# Patient Record
Sex: MALE | Race: WHITE | Employment: OTHER | URBAN - METROPOLITAN AREA
[De-identification: names, ages, dates, MRNs, and addresses within clinical notes are randomized per-mention and may not be internally consistent; named-entity substitution may affect disease eponyms.]

---

## 2017-04-19 ENCOUNTER — PATIENT MESSAGE (OUTPATIENT)
Dept: FAMILY MEDICINE CLINIC | Age: 62
End: 2017-04-19

## 2017-04-26 DIAGNOSIS — Z12.11 COLON CANCER SCREENING: Primary | ICD-10-CM

## 2017-06-13 ENCOUNTER — OFFICE VISIT (OUTPATIENT)
Dept: FAMILY MEDICINE CLINIC | Age: 62
End: 2017-06-13
Payer: COMMERCIAL

## 2017-06-13 VITALS
WEIGHT: 295 LBS | HEIGHT: 70 IN | DIASTOLIC BLOOD PRESSURE: 74 MMHG | HEART RATE: 85 BPM | BODY MASS INDEX: 42.23 KG/M2 | SYSTOLIC BLOOD PRESSURE: 122 MMHG

## 2017-06-13 DIAGNOSIS — I10 ESSENTIAL HYPERTENSION: ICD-10-CM

## 2017-06-13 PROCEDURE — 99213 OFFICE O/P EST LOW 20 MIN: CPT | Performed by: FAMILY MEDICINE

## 2017-06-13 ASSESSMENT — PATIENT HEALTH QUESTIONNAIRE - PHQ9
SUM OF ALL RESPONSES TO PHQ QUESTIONS 1-9: 0
2. FEELING DOWN, DEPRESSED OR HOPELESS: 0
SUM OF ALL RESPONSES TO PHQ9 QUESTIONS 1 & 2: 0
1. LITTLE INTEREST OR PLEASURE IN DOING THINGS: 0

## 2017-06-13 ASSESSMENT — ENCOUNTER SYMPTOMS
ABDOMINAL PAIN: 0
CONSTIPATION: 0
BACK PAIN: 0
COUGH: 0
DIARRHEA: 0
WHEEZING: 0
BLOOD IN STOOL: 0
SHORTNESS OF BREATH: 0

## 2017-12-18 ENCOUNTER — OFFICE VISIT (OUTPATIENT)
Dept: FAMILY MEDICINE CLINIC | Age: 62
End: 2017-12-18
Payer: COMMERCIAL

## 2017-12-18 VITALS
SYSTOLIC BLOOD PRESSURE: 136 MMHG | HEART RATE: 74 BPM | BODY MASS INDEX: 37.62 KG/M2 | WEIGHT: 254 LBS | HEIGHT: 69 IN | DIASTOLIC BLOOD PRESSURE: 78 MMHG

## 2017-12-18 DIAGNOSIS — K43.9 ABDOMINAL WALL HERNIA: ICD-10-CM

## 2017-12-18 DIAGNOSIS — I10 ESSENTIAL HYPERTENSION: Primary | ICD-10-CM

## 2017-12-18 PROCEDURE — 99213 OFFICE O/P EST LOW 20 MIN: CPT | Performed by: FAMILY MEDICINE

## 2017-12-18 RX ORDER — BIOTIN 1 MG
1 TABLET ORAL DAILY
COMMUNITY

## 2017-12-18 RX ORDER — CHLORAL HYDRATE 500 MG
3000 CAPSULE ORAL 3 TIMES DAILY
Status: ON HOLD | COMMUNITY
End: 2020-11-20

## 2017-12-18 ASSESSMENT — ENCOUNTER SYMPTOMS
COUGH: 0
BACK PAIN: 0
BLOOD IN STOOL: 0
CONSTIPATION: 0
DIARRHEA: 0
ABDOMINAL PAIN: 0
WHEEZING: 0
SHORTNESS OF BREATH: 0

## 2017-12-20 DIAGNOSIS — I10 ESSENTIAL HYPERTENSION: ICD-10-CM

## 2017-12-20 RX ORDER — BISOPROLOL FUMARATE AND HYDROCHLOROTHIAZIDE 5; 6.25 MG/1; MG/1
TABLET ORAL
Qty: 90 TABLET | Refills: 3 | Status: SHIPPED | OUTPATIENT
Start: 2017-12-20 | End: 2018-12-17 | Stop reason: SDUPTHER

## 2018-12-17 DIAGNOSIS — I10 ESSENTIAL HYPERTENSION: ICD-10-CM

## 2018-12-17 RX ORDER — BISOPROLOL FUMARATE AND HYDROCHLOROTHIAZIDE 5; 6.25 MG/1; MG/1
TABLET ORAL
Qty: 90 TABLET | Refills: 3 | Status: SHIPPED | OUTPATIENT
Start: 2018-12-17 | End: 2019-12-14 | Stop reason: SDUPTHER

## 2019-12-14 DIAGNOSIS — I10 ESSENTIAL HYPERTENSION: ICD-10-CM

## 2019-12-16 RX ORDER — BISOPROLOL FUMARATE AND HYDROCHLOROTHIAZIDE 5; 6.25 MG/1; MG/1
TABLET ORAL
Qty: 90 TABLET | Refills: 0 | Status: SHIPPED | OUTPATIENT
Start: 2019-12-16 | End: 2020-03-16

## 2020-03-16 RX ORDER — BISOPROLOL FUMARATE AND HYDROCHLOROTHIAZIDE 5; 6.25 MG/1; MG/1
TABLET ORAL
Qty: 90 TABLET | Refills: 3 | Status: SHIPPED | OUTPATIENT
Start: 2020-03-16 | End: 2021-06-04

## 2020-11-19 ENCOUNTER — APPOINTMENT (OUTPATIENT)
Dept: ULTRASOUND IMAGING | Age: 65
DRG: 415 | End: 2020-11-19
Payer: COMMERCIAL

## 2020-11-19 ENCOUNTER — NURSE TRIAGE (OUTPATIENT)
Dept: OTHER | Facility: CLINIC | Age: 65
End: 2020-11-19

## 2020-11-19 ENCOUNTER — HOSPITAL ENCOUNTER (INPATIENT)
Age: 65
LOS: 3 days | Discharge: HOME | DRG: 415 | End: 2020-11-22
Attending: EMERGENCY MEDICINE | Admitting: FAMILY MEDICINE
Payer: COMMERCIAL

## 2020-11-19 PROBLEM — Z85.038 HISTORY OF COLON CANCER: Status: ACTIVE | Noted: 2020-11-19

## 2020-11-19 PROBLEM — K81.0 CHOLECYSTITIS, ACUTE: Status: ACTIVE | Noted: 2020-11-19

## 2020-11-19 PROBLEM — E66.9 OBESITY (BMI 35.0-39.9 WITHOUT COMORBIDITY): Chronic | Status: ACTIVE | Noted: 2020-11-19

## 2020-11-19 PROBLEM — K21.9 GERD (GASTROESOPHAGEAL REFLUX DISEASE): Status: ACTIVE | Noted: 2020-11-19

## 2020-11-19 PROBLEM — K81.9 CHOLECYSTITIS: Status: ACTIVE | Noted: 2020-11-19

## 2020-11-19 LAB
ABSOLUTE EOS #: 0.03 K/UL (ref 0–0.44)
ABSOLUTE IMMATURE GRANULOCYTE: 0.03 K/UL (ref 0–0.3)
ABSOLUTE LYMPH #: 0.79 K/UL (ref 1.1–3.7)
ABSOLUTE MONO #: 0.62 K/UL (ref 0.1–1.2)
ALBUMIN SERPL-MCNC: 4.3 G/DL (ref 3.5–5.2)
ALBUMIN/GLOBULIN RATIO: ABNORMAL (ref 1–2.5)
ALP BLD-CCNC: 98 U/L (ref 40–129)
ALT SERPL-CCNC: 28 U/L (ref 5–41)
AMYLASE: 20 U/L (ref 28–100)
ANION GAP SERPL CALCULATED.3IONS-SCNC: 8 MMOL/L (ref 9–17)
AST SERPL-CCNC: 68 U/L
BASOPHILS # BLD: 0 % (ref 0–2)
BASOPHILS ABSOLUTE: <0.03 K/UL (ref 0–0.2)
BILIRUB SERPL-MCNC: 1.91 MG/DL (ref 0.3–1.2)
BILIRUBIN DIRECT: 0.88 MG/DL
BILIRUBIN, INDIRECT: 1.03 MG/DL (ref 0–1)
BUN BLDV-MCNC: 12 MG/DL (ref 8–23)
BUN/CREAT BLD: 19 (ref 9–20)
CALCIUM SERPL-MCNC: 9.2 MG/DL (ref 8.6–10.4)
CHLORIDE BLD-SCNC: 97 MMOL/L (ref 98–107)
CO2: 29 MMOL/L (ref 20–31)
CREAT SERPL-MCNC: 0.62 MG/DL (ref 0.7–1.2)
DIFFERENTIAL TYPE: ABNORMAL
EOSINOPHILS RELATIVE PERCENT: 0 % (ref 1–4)
GFR AFRICAN AMERICAN: >60 ML/MIN
GFR NON-AFRICAN AMERICAN: >60 ML/MIN
GFR SERPL CREATININE-BSD FRML MDRD: ABNORMAL ML/MIN/{1.73_M2}
GFR SERPL CREATININE-BSD FRML MDRD: ABNORMAL ML/MIN/{1.73_M2}
GLOBULIN: ABNORMAL G/DL (ref 1.5–3.8)
GLUCOSE BLD-MCNC: 128 MG/DL (ref 70–99)
HCT VFR BLD CALC: 38.4 % (ref 40.7–50.3)
HEMOGLOBIN: 13.3 G/DL (ref 13–17)
IMMATURE GRANULOCYTES: 0 %
LACTIC ACID, SEPSIS WHOLE BLOOD: NORMAL MMOL/L (ref 0.5–1.9)
LACTIC ACID, SEPSIS WHOLE BLOOD: NORMAL MMOL/L (ref 0.5–1.9)
LACTIC ACID, SEPSIS: 0.9 MMOL/L (ref 0.5–1.9)
LACTIC ACID, SEPSIS: 1 MMOL/L (ref 0.5–1.9)
LIPASE: 17 U/L (ref 13–60)
LYMPHOCYTES # BLD: 11 % (ref 24–43)
MCH RBC QN AUTO: 34.8 PG (ref 25.2–33.5)
MCHC RBC AUTO-ENTMCNC: 34.6 G/DL (ref 28.4–34.8)
MCV RBC AUTO: 100.5 FL (ref 82.6–102.9)
MONOCYTES # BLD: 9 % (ref 3–12)
NRBC AUTOMATED: 0 PER 100 WBC
PDW BLD-RTO: 13.1 % (ref 11.8–14.4)
PLATELET # BLD: 144 K/UL (ref 138–453)
PLATELET ESTIMATE: ABNORMAL
PMV BLD AUTO: 9.2 FL (ref 8.1–13.5)
POTASSIUM SERPL-SCNC: 3.8 MMOL/L (ref 3.7–5.3)
RBC # BLD: 3.82 M/UL (ref 4.21–5.77)
RBC # BLD: ABNORMAL 10*6/UL
SEG NEUTROPHILS: 80 % (ref 36–65)
SEGMENTED NEUTROPHILS ABSOLUTE COUNT: 5.79 K/UL (ref 1.5–8.1)
SODIUM BLD-SCNC: 134 MMOL/L (ref 135–144)
TOTAL PROTEIN: 7.6 G/DL (ref 6.4–8.3)
WBC # BLD: 7.3 K/UL (ref 3.5–11.3)
WBC # BLD: ABNORMAL 10*3/UL

## 2020-11-19 PROCEDURE — 2500000003 HC RX 250 WO HCPCS: Performed by: SURGERY

## 2020-11-19 PROCEDURE — 80076 HEPATIC FUNCTION PANEL: CPT

## 2020-11-19 PROCEDURE — 6360000002 HC RX W HCPCS: Performed by: NURSE PRACTITIONER

## 2020-11-19 PROCEDURE — C9113 INJ PANTOPRAZOLE SODIUM, VIA: HCPCS | Performed by: NURSE PRACTITIONER

## 2020-11-19 PROCEDURE — 76705 ECHO EXAM OF ABDOMEN: CPT

## 2020-11-19 PROCEDURE — 83605 ASSAY OF LACTIC ACID: CPT

## 2020-11-19 PROCEDURE — 99219 PR INITIAL OBSERVATION CARE/DAY 50 MINUTES: CPT | Performed by: FAMILY MEDICINE

## 2020-11-19 PROCEDURE — 2580000003 HC RX 258: Performed by: SURGERY

## 2020-11-19 PROCEDURE — 82150 ASSAY OF AMYLASE: CPT

## 2020-11-19 PROCEDURE — 2580000003 HC RX 258: Performed by: NURSE PRACTITIONER

## 2020-11-19 PROCEDURE — APPSS45 APP SPLIT SHARED TIME 31-45 MINUTES: Performed by: NURSE PRACTITIONER

## 2020-11-19 PROCEDURE — 6360000002 HC RX W HCPCS: Performed by: SURGERY

## 2020-11-19 PROCEDURE — 85025 COMPLETE CBC W/AUTO DIFF WBC: CPT

## 2020-11-19 PROCEDURE — 96376 TX/PRO/DX INJ SAME DRUG ADON: CPT

## 2020-11-19 PROCEDURE — G0378 HOSPITAL OBSERVATION PER HR: HCPCS

## 2020-11-19 PROCEDURE — 83690 ASSAY OF LIPASE: CPT

## 2020-11-19 PROCEDURE — 1200000000 HC SEMI PRIVATE

## 2020-11-19 PROCEDURE — 80048 BASIC METABOLIC PNL TOTAL CA: CPT

## 2020-11-19 PROCEDURE — 99282 EMERGENCY DEPT VISIT SF MDM: CPT

## 2020-11-19 PROCEDURE — 96365 THER/PROPH/DIAG IV INF INIT: CPT

## 2020-11-19 PROCEDURE — 87040 BLOOD CULTURE FOR BACTERIA: CPT

## 2020-11-19 PROCEDURE — 96375 TX/PRO/DX INJ NEW DRUG ADDON: CPT

## 2020-11-19 RX ORDER — NICOTINE 21 MG/24HR
1 PATCH, TRANSDERMAL 24 HOURS TRANSDERMAL DAILY PRN
Status: DISCONTINUED | OUTPATIENT
Start: 2020-11-19 | End: 2020-11-20

## 2020-11-19 RX ORDER — POTASSIUM CHLORIDE 20 MEQ/1
40 TABLET, EXTENDED RELEASE ORAL PRN
Status: DISCONTINUED | OUTPATIENT
Start: 2020-11-19 | End: 2020-11-20

## 2020-11-19 RX ORDER — DEXTROSE, SODIUM CHLORIDE, AND POTASSIUM CHLORIDE 5; .45; .15 G/100ML; G/100ML; G/100ML
INJECTION INTRAVENOUS CONTINUOUS
Status: DISCONTINUED | OUTPATIENT
Start: 2020-11-19 | End: 2020-11-20

## 2020-11-19 RX ORDER — POTASSIUM CHLORIDE 7.45 MG/ML
10 INJECTION INTRAVENOUS PRN
Status: DISCONTINUED | OUTPATIENT
Start: 2020-11-19 | End: 2020-11-20

## 2020-11-19 RX ORDER — SODIUM CHLORIDE 9 MG/ML
10 INJECTION INTRAVENOUS ONCE
Status: COMPLETED | OUTPATIENT
Start: 2020-11-19 | End: 2020-11-19

## 2020-11-19 RX ORDER — ONDANSETRON 2 MG/ML
4 INJECTION INTRAMUSCULAR; INTRAVENOUS EVERY 6 HOURS PRN
Status: DISCONTINUED | OUTPATIENT
Start: 2020-11-19 | End: 2020-11-20

## 2020-11-19 RX ORDER — MORPHINE SULFATE 2 MG/ML
2 INJECTION, SOLUTION INTRAMUSCULAR; INTRAVENOUS ONCE
Status: COMPLETED | OUTPATIENT
Start: 2020-11-19 | End: 2020-11-19

## 2020-11-19 RX ORDER — POLYETHYLENE GLYCOL 3350 17 G/17G
17 POWDER, FOR SOLUTION ORAL DAILY PRN
Status: DISCONTINUED | OUTPATIENT
Start: 2020-11-19 | End: 2020-11-20

## 2020-11-19 RX ORDER — MORPHINE SULFATE 2 MG/ML
2 INJECTION, SOLUTION INTRAMUSCULAR; INTRAVENOUS
Status: DISCONTINUED | OUTPATIENT
Start: 2020-11-19 | End: 2020-11-20

## 2020-11-19 RX ORDER — ONDANSETRON 2 MG/ML
4 INJECTION INTRAMUSCULAR; INTRAVENOUS ONCE
Status: COMPLETED | OUTPATIENT
Start: 2020-11-19 | End: 2020-11-19

## 2020-11-19 RX ORDER — PANTOPRAZOLE SODIUM 40 MG/10ML
40 INJECTION, POWDER, LYOPHILIZED, FOR SOLUTION INTRAVENOUS ONCE
Status: COMPLETED | OUTPATIENT
Start: 2020-11-19 | End: 2020-11-19

## 2020-11-19 RX ORDER — PROMETHAZINE HYDROCHLORIDE 25 MG/1
12.5 TABLET ORAL EVERY 6 HOURS PRN
Status: DISCONTINUED | OUTPATIENT
Start: 2020-11-19 | End: 2020-11-20

## 2020-11-19 RX ORDER — ACETAMINOPHEN 325 MG/1
650 TABLET ORAL EVERY 6 HOURS PRN
Status: DISCONTINUED | OUTPATIENT
Start: 2020-11-19 | End: 2020-11-20

## 2020-11-19 RX ORDER — SODIUM CHLORIDE 0.9 % (FLUSH) 0.9 %
10 SYRINGE (ML) INJECTION PRN
Status: DISCONTINUED | OUTPATIENT
Start: 2020-11-19 | End: 2020-11-20

## 2020-11-19 RX ORDER — SODIUM CHLORIDE 9 MG/ML
INJECTION, SOLUTION INTRAVENOUS CONTINUOUS
Status: DISCONTINUED | OUTPATIENT
Start: 2020-11-19 | End: 2020-11-19

## 2020-11-19 RX ORDER — ACETAMINOPHEN 650 MG/1
650 SUPPOSITORY RECTAL EVERY 6 HOURS PRN
Status: DISCONTINUED | OUTPATIENT
Start: 2020-11-19 | End: 2020-11-20

## 2020-11-19 RX ORDER — PANTOPRAZOLE SODIUM 40 MG/1
40 TABLET, DELAYED RELEASE ORAL
Status: DISCONTINUED | OUTPATIENT
Start: 2020-11-20 | End: 2020-11-20

## 2020-11-19 RX ORDER — MORPHINE SULFATE 4 MG/ML
4 INJECTION, SOLUTION INTRAMUSCULAR; INTRAVENOUS
Status: DISCONTINUED | OUTPATIENT
Start: 2020-11-19 | End: 2020-11-20

## 2020-11-19 RX ORDER — SODIUM CHLORIDE 0.9 % (FLUSH) 0.9 %
10 SYRINGE (ML) INJECTION EVERY 12 HOURS SCHEDULED
Status: DISCONTINUED | OUTPATIENT
Start: 2020-11-19 | End: 2020-11-20

## 2020-11-19 RX ORDER — MAGNESIUM SULFATE 1 G/100ML
1 INJECTION INTRAVENOUS PRN
Status: DISCONTINUED | OUTPATIENT
Start: 2020-11-19 | End: 2020-11-20

## 2020-11-19 RX ORDER — MORPHINE SULFATE 4 MG/ML
4 INJECTION, SOLUTION INTRAMUSCULAR; INTRAVENOUS
Status: COMPLETED | OUTPATIENT
Start: 2020-11-19 | End: 2020-11-19

## 2020-11-19 RX ADMIN — ONDANSETRON 4 MG: 2 INJECTION INTRAMUSCULAR; INTRAVENOUS at 11:20

## 2020-11-19 RX ADMIN — MORPHINE SULFATE 2 MG: 2 INJECTION, SOLUTION INTRAMUSCULAR; INTRAVENOUS at 12:55

## 2020-11-19 RX ADMIN — POTASSIUM CHLORIDE, DEXTROSE MONOHYDRATE AND SODIUM CHLORIDE: 150; 5; 450 INJECTION, SOLUTION INTRAVENOUS at 16:46

## 2020-11-19 RX ADMIN — PIPERACILLIN AND TAZOBACTAM 3.38 G: 3; .375 INJECTION, POWDER, LYOPHILIZED, FOR SOLUTION INTRAVENOUS at 18:23

## 2020-11-19 RX ADMIN — SODIUM CHLORIDE: 9 INJECTION, SOLUTION INTRAVENOUS at 11:20

## 2020-11-19 RX ADMIN — MORPHINE SULFATE 4 MG: 4 INJECTION, SOLUTION INTRAMUSCULAR; INTRAVENOUS at 20:37

## 2020-11-19 RX ADMIN — ONDANSETRON 4 MG: 2 INJECTION INTRAMUSCULAR; INTRAVENOUS at 12:55

## 2020-11-19 RX ADMIN — PIPERACILLIN SODIUM AND TAZOBACTAM SODIUM 4.5 G: 4; .5 INJECTION, POWDER, LYOPHILIZED, FOR SOLUTION INTRAVENOUS at 13:09

## 2020-11-19 RX ADMIN — MORPHINE SULFATE 4 MG: 4 INJECTION, SOLUTION INTRAMUSCULAR; INTRAVENOUS at 15:13

## 2020-11-19 RX ADMIN — Medication 10 ML: at 11:20

## 2020-11-19 RX ADMIN — POTASSIUM CHLORIDE, DEXTROSE MONOHYDRATE AND SODIUM CHLORIDE: 150; 5; 450 INJECTION, SOLUTION INTRAVENOUS at 23:59

## 2020-11-19 RX ADMIN — PANTOPRAZOLE SODIUM 40 MG: 40 INJECTION, POWDER, FOR SOLUTION INTRAVENOUS at 11:20

## 2020-11-19 RX ADMIN — Medication 10 ML: at 18:12

## 2020-11-19 RX ADMIN — Medication 10 ML: at 16:44

## 2020-11-19 RX ADMIN — MORPHINE SULFATE 4 MG: 4 INJECTION, SOLUTION INTRAMUSCULAR; INTRAVENOUS at 18:12

## 2020-11-19 RX ADMIN — MORPHINE SULFATE 4 MG: 4 INJECTION, SOLUTION INTRAMUSCULAR; INTRAVENOUS at 23:56

## 2020-11-19 ASSESSMENT — PAIN DESCRIPTION - ONSET
ONSET: ON-GOING

## 2020-11-19 ASSESSMENT — PAIN DESCRIPTION - PAIN TYPE
TYPE: ACUTE PAIN

## 2020-11-19 ASSESSMENT — PAIN DESCRIPTION - LOCATION
LOCATION: ABDOMEN

## 2020-11-19 ASSESSMENT — PAIN DESCRIPTION - FREQUENCY
FREQUENCY: CONTINUOUS
FREQUENCY: CONTINUOUS
FREQUENCY: INTERMITTENT
FREQUENCY: CONTINUOUS

## 2020-11-19 ASSESSMENT — PAIN SCALES - GENERAL
PAINLEVEL_OUTOF10: 7
PAINLEVEL_OUTOF10: 9
PAINLEVEL_OUTOF10: 9
PAINLEVEL_OUTOF10: 3
PAINLEVEL_OUTOF10: 7
PAINLEVEL_OUTOF10: 5
PAINLEVEL_OUTOF10: 3
PAINLEVEL_OUTOF10: 3
PAINLEVEL_OUTOF10: 4
PAINLEVEL_OUTOF10: 7
PAINLEVEL_OUTOF10: 5
PAINLEVEL_OUTOF10: 2

## 2020-11-19 ASSESSMENT — ENCOUNTER SYMPTOMS
CONSTIPATION: 0
RHINORRHEA: 0
VOMITING: 1
SHORTNESS OF BREATH: 0
BACK PAIN: 0
NAUSEA: 1
COUGH: 0
WHEEZING: 0
ABDOMINAL PAIN: 1
SORE THROAT: 0
COLOR CHANGE: 0
DIARRHEA: 1
DIARRHEA: 0

## 2020-11-19 ASSESSMENT — PAIN - FUNCTIONAL ASSESSMENT
PAIN_FUNCTIONAL_ASSESSMENT: ACTIVITIES ARE NOT PREVENTED

## 2020-11-19 ASSESSMENT — PAIN DESCRIPTION - ORIENTATION
ORIENTATION: RIGHT;UPPER

## 2020-11-19 ASSESSMENT — PAIN DESCRIPTION - PROGRESSION
CLINICAL_PROGRESSION: GRADUALLY IMPROVING
CLINICAL_PROGRESSION: GRADUALLY WORSENING

## 2020-11-19 ASSESSMENT — PAIN DESCRIPTION - DESCRIPTORS
DESCRIPTORS: SHARP
DESCRIPTORS: SHARP;ACHING

## 2020-11-19 NOTE — ED PROVIDER NOTES
[facility] ED  eMERGENCY dEPARTMENT eNCOUnter      Pt Name: Timothy Dunn  MRN: [id number]  [place] 1955  Date of evaluation: 11/19/2020  Provider: LAWRENCE Smith CNP    CHIEF COMPLAINT       Chief Complaint   Patient presents with    Abdominal Pain     UPPER ABD PAIN  X 4 DAYS         HISTORY OF PRESENT ILLNESS  (Location/Symptom, Timing/Onset, Context/Setting, Quality, Duration, Modifying Factors, Severity.)   Timothy Dunn is a 63 y.o. male who presents to the emergency department via private auto for epigastric pain that radiates to the remainder of his abdomen. Onset was 4 days ago. Reports N/V/D. Denies cough, fever, urinary symptoms. Rates his pain 8/10. Nursing Notes were reviewed. ALLERGIES     Patient has no known allergies. CURRENT MEDICATIONS       Previous Medications    BIOTIN 1000 MCG TABS    Take by mouth    BISOPROLOL-HYDROCHLOROTHIAZIDE (ZIAC) 5-6.25 MG PER TABLET    TAKE 1 TABLET DAILY (NEED AN APPOINTMENT FOR FURTHER REFILLS)    OMEGA-3 FATTY ACIDS (FISH OIL) 1000 MG CAPS    Take 3,000 mg by mouth 3 times daily    OMEPRAZOLE 20 MG EC TABLET    Take 20 mg by mouth daily     POTASSIUM (POTASSIMIN PO)    Take by mouth       PAST MEDICAL HISTORY         Diagnosis Date    Colon cancer ([address]     Melanoma ([address]        SURGICAL HISTORY           Procedure Laterality Date    AV FISTULA REPAIR      [facility]06/2016    [facility]    HERNIA REPAIR      KNEE ARTHROSCOPY           FAMILY HISTORY     No family history on file. No family status information on file. SOCIAL HISTORY      reports that he quit smoking about 12 years ago. He has never used smokeless tobacco. He reports current alcohol use of about 16.7 standard drinks of alcohol per week. He reports that he does not use drugs.     REVIEW OF SYSTEMS    (2-9 systems for level 4, 10 or more for level 5)     Review of Systems   Constitutional: Negative for chills, diaphoresis, fatigue and fever.   HENT: Negative for congestion and sore throat. Respiratory: Negative for cough and shortness of breath. Cardiovascular: Negative for chest pain. Gastrointestinal: Positive for abdominal pain, diarrhea, nausea and vomiting. Genitourinary: Negative for dysuria, flank pain, frequency, hematuria and urgency. Musculoskeletal: Negative for back pain. Skin: Negative for color change, rash and wound. Neurological: Negative for dizziness, weakness, light-headedness and headaches. Except as noted above the remainder of the review of systems was reviewed and negative. PHYSICAL EXAM    (up to 7 for level 4, 8 or more for level 5)     ED Triage Vitals [11/19/20 1045]   BP Temp Temp Source Pulse Resp SpO2 Height Weight   (!) 170/83 98.9 °F (37.2 °C) Oral 84 18 96 % 5' 9\" (1.753 m) 270 lb (122.5 kg)     Physical Exam  Vitals signs reviewed. Constitutional:       General: He is not in acute distress. Appearance: He is well-developed. He is not diaphoretic. Eyes:      General: No scleral icterus. Conjunctiva/sclera: Conjunctivae normal.   Neck:      Vascular: No JVD. Cardiovascular:      Rate and Rhythm: Normal rate. Pulmonary:      Effort: Pulmonary effort is normal. No respiratory distress. Breath sounds: Normal breath sounds. Abdominal:      General: There is no distension. Palpations: Abdomen is soft. Tenderness: There is abdominal tenderness in the right upper quadrant and epigastric area. There is no guarding. Musculoskeletal:      Comments: Moves extremities   Skin:     General: Skin is warm and dry. Findings: No rash. Neurological:      Mental Status: He is alert and oriented to person, place, and time. GCS: GCS eye subscore is 4. GCS verbal subscore is 5. GCS motor subscore is 6.    Psychiatric:         Behavior: Behavior normal.         DIAGNOSTIC RESULTS     RADIOLOGY:   Non-plain film images such as CT, Ultrasound and MRI are read by the radiologist. Hodan Sauce radiographic images are visualized and preliminarily interpreted by the emergency physician with the below findings:    Interpretation per the Radiologist below, if available at the time of this note:    Us Gallbladder Ruq    Result Date: 11/19/2020  EXAMINATION: RIGHT UPPER [address] 11/19/2020 11:55 am COMPARISON: None. HISTORY: ORDERING SYSTEM PROVIDED HISTORY: Pain TECHNOLOGIST PROVIDED HISTORY: Pain Reason for Exam: abd pain since Monday FINDINGS: LIVER:  Liver appears echogenic. No focal hepatic abnormality. No intrahepatic biliary ductal dilatation. BILIARY SYSTEM:  No gallstones. Pericholecystic fluid. Negative sonographic Cedeño's sign. The gallbladder wall is not thickened. Common bile duct is within normal limits measuring 4 mm. RIGHT KIDNEY: The right kidney is grossly unremarkable without evidence of hydronephrosis. PANCREAS:  Visualized portions of the pancreas are unremarkable. OTHER: No evidence of right upper quadrant ascites. 1. No gallstones, negative sonographic Cedeño's sign but pericholecystic fluid is present. Acalculous cholecystitis could give this appearance.  2. Fatty infiltration of the liver       LABS:  Labs Reviewed   BASIC METABOLIC PANEL - Abnormal; Notable for the following components:       Result Value    Glucose 128 (*)     CREATININE 0.62 (*)     Sodium 134 (*)     Chloride 97 (*)     Anion Gap 8 (*)     All other components within normal limits   CBC WITH AUTO DIFFERENTIAL - Abnormal; Notable for the following components:    RBC 3.82 (*)     Hematocrit 38.4 (*)     MCH 34.8 (*)     Seg Neutrophils 80 (*)     Lymphocytes 11 (*)     Eosinophils % 0 (*)     Absolute Lymph # 0.79 (*)     All other components within normal limits   HEPATIC FUNCTION PANEL - Abnormal; Notable for the following components:    AST 68 (*)     Total Bilirubin 1.91 (*)     Bilirubin, Direct 0.88 (*)     Bilirubin, Indirect 1.03 (*)     All other components within normal limits   AMYLASE - Abnormal; Notable for the following components:    Amylase 20 (*)     All other components within normal limits   CULTURE, BLOOD 1   CULTURE, BLOOD 1   LIPASE   LACTATE, SEPSIS   LACTATE, SEPSIS       All other labs were within normal range or not returned as of this dictation. EMERGENCY DEPARTMENT COURSE and DIFFERENTIAL DIAGNOSIS/MDM:   Vitals:    Vitals:    11/19/20 1045   BP: (!) 170/83   Pulse: 84   Resp: 18   Temp: 98.9 °F (37.2 °C)   TempSrc: Oral   SpO2: 96%   Weight: 270 lb (122.5 kg)   Height: 5' 9\" (1.753 m)       MEDICATIONS GIVEN IN THE ED:  Medications   0.9 % sodium chloride infusion ( Intravenous Stopped 11/19/20 1225)   morphine sulfate (PF) injection 4 mg (has no administration in time range)   ondansetron (ZOFRAN) injection 4 mg (4 mg Intravenous Given 11/19/20 1120)   pantoprazole (PROTONIX) injection 40 mg (40 mg Intravenous Given 11/19/20 1120)     And   sodium chloride (PF) 0.9 % injection 10 mL (10 mLs Intravenous Given 11/19/20 1120)   morphine (PF) injection 2 mg (2 mg Intravenous Given 11/19/20 1255)   ondansetron (ZOFRAN) injection 4 mg (4 mg Intravenous Given 11/19/20 1255)   piperacillin-tazobactam (ZOSYN) 4.5 g in dextrose 5 % 100 mL IVPB (mini-bag) (0 g Intravenous Stopped 11/19/20 1344)       CLINICAL DECISION MAKING:  The patient presented alert with a nontoxic appearance and was seen in conjunction with Dr. Cicero Goldberg. Imaging showed findings concerning for cholecystitis. His bilirubin was elevated. He will be admitted for further evaluation and treatment. I spoke with Vazquez Jennings from [facility]. Dr. Cicero Goldberg spoke with  [facility]. CONSULTS:  IP CONSULT TO INTERNAL MEDICINE  IP CONSULT TO GENERAL SURGERY  IP CONSULT TO GENERAL SURGERY        FINAL IMPRESSION      1.  Cholecystitis            Problem List  Patient Active Problem List   Diagnosis Code    Dyslipidemia E78.5    Essential hypertension I10    Cholecystitis, acute K81.0         DISPOSITION/PLAN DISPOSITION Admitted 11/19/2020 01:59:59 PM      PATIENT REFERRED TO:   No follow-up provider specified.     DISCHARGE MEDICATIONS:     New Prescriptions    No medications on file           (Please note that portions of this note were completed with a voice recognition program.  Efforts were made to edit the dictations but occasionally words are mis-transcribed.)    LAWRENCE Castro - LAWRENCE Yoder CNP  11/19/20 3418

## 2020-11-19 NOTE — PROGRESS NOTES
Pt admitted to room #2003 from ER  Oriented to room and call light/tv controls. Bed in lowest position, wheels locked, 2/4 side rails up  Call light in reach, room free of clutter, adequate lighting provided.

## 2020-11-19 NOTE — CONSULTS
General Surgery Consult        Name: Andrew Greene  MRN: [id number]     Acct: [de-identified]  Room:[place] [address]  Admit Date: 11/19/2020    PCP: Kenneth Celestin MD    CHIEF COMPLAINT:    Chief Complaint   Patient presents with    Abdominal Pain     UPPER ABD PAIN  X 4 DAYS       History Obtained From:  patient    HISTORY OF PRESENT ILLNESS:    Andrew Greene is a 63 y.o.  male who presents with epigastric abdominal pain. This first started the evening of November 12 and in the early morning hours of November 13. He had eaten pizza for supper on the evening of the 12th and was awakened in the middle of the night with a severe epigastric pain which lasted most of the night and then resolved. Throughout the day on the 13th he was fairly pain-free but had less than normal appetite. The next day the epigastric pain came back again. He had some nausea with dry heaves but no vomiting. He ate very sparingly on that day. Throughout the day yesterday and today he has had steady unremitting pain without fever or chills. The wife felt that his eyes were looking \"slightly yellow\". And so he was brought to the emergency room. Here he is hemodynamically stable, not febrile but in some distress from persistent epigastric pain. Work-up in the emergency room showed his white blood cell count to be 7300. He does have a slightly elevated bilirubin at 1.9 but other liver function tests are normal with the exception of mild elevation of AST at 68. An ultrasound of the liver and gallbladder was performed and this does show very distended gallbladder with some pericholecystic fluid. The gallbladder is not thick-walled. Gallstones were not seen. His amylase is normal as is the lipase level. Lactic acid was 1.0. Is medical history is significant for hypertension and obesity.     Surgical history is significant for history of colon resection for colon cancer and then at least 2 incisional hernia repairs including placement of intra-abdominal mesh. He also has had repair of an inguinal hernia. Past Medical History:    Past Medical History:   Diagnosis Date    Colon cancer ([facility] 2007    Melanoma ([facility] 1995    Hypertension     Obesity (BMI 39)         Past Surgical History:    Past Surgical History:   Procedure Laterality Date    AV FISTULA REPAIR      [facility]OS[facility]/2016    [facility]    KNEE ARTHROSCOPY      LAPAROSCOPIC COLECTOMY FOR COLON CANCER Left (sigmoid) 2007    INCISIONAL HERNIA REPAIR  2011, intraperitoneal mesh  2011    LEFT AXILLARY DISSECTION (23 LYMPH NODES)  LEFT 1997    LEFT INGUINAL HERNIA REPAIR  1995    EXCISE MELANOMA CHEST WALL  1995        Medications Prior to Admission:     Prior to Admission medications    Medication Sig Start Date End Date Taking? Authorizing Provider   omeprazole 20 MG EC tablet Take 20 mg by mouth daily    Yes Historical Provider, MD   bisoprolol-hydrochlorothiazide (ZIAC) 5-6.25 MG per tablet TAKE 1 TABLET DAILY (NEED AN APPOINTMENT FOR FURTHER REFILLS) 3/16/20   Dougie William MD   Omega-3 Fatty Acids (FISH OIL) 1000 MG CAPS Take 3,000 mg by mouth 3 times daily    Historical Provider, MD   Biotin 1000 MCG TABS Take by mouth    Historical Provider, MD   Potassium (POTASSIMIN PO) Take by mouth    Historical Provider, MD        Allergies:  Patient has no known allergies. Social History:   Tobacco:    reports that he quit smoking about 12 years ago. He has never used smokeless tobacco.  Alcohol:      reports current alcohol use of about 16.7 standard drinks of alcohol per week. Drug Use:  reports no history of drug use. Family History:   No family history on file. REVIEW OF SYSTEMS:  Review of Systems   Constitutional: Negative for fever and unexpected weight change. HENT: Negative for congestion, rhinorrhea and sore throat. Eyes: Negative for visual disturbance. Respiratory: Negative for cough, shortness of breath and wheezing.     Cardiovascular: Negative for chest pain, palpitations and leg swelling. Gastrointestinal: Positive for abdominal pain (See HPI) and nausea. Negative for constipation and diarrhea. Genitourinary: Negative for difficulty urinating, frequency and urgency. Musculoskeletal: Negative for arthralgias and myalgias. Skin: Negative for rash and wound. Neurological: Negative for tremors and weakness. Hematological: Negative for adenopathy. Does not bruise/bleed easily. Code Status:  No Order    PHYSICAL EXAM:  Vitals:  BP (!) 170/83   Pulse 84   Temp 98.9 °F (37.2 °C) (Oral)   Resp 18   Ht 5' 9\" (1.753 m)   Wt 270 lb (122.5 kg)   SpO2 96%   BMI 39.87 kg/m²   Temp (24hrs), Av.9 °F (37.2 °C), Min:98.9 °F (37.2 °C), Max:98.9 °F (37.2 °C)    Physical Exam  General appearance -obese 31-year-old male in some distress secondary to epigastric pain. He is up walking around when I first came to see him in an attempt to get relief of his pain. Mental status - alert, oriented to person, place, and time  Eyes - pupils equal and reactive, extraocular eye movements intact, there is a slight hint of scleral icterus, and definite. Mouth - mucous membranes moist, pharynx normal without lesions and dental hygiene good  Neck - supple, no significant adenopathy, thyroid exam: thyroid is normal in size without nodules or tenderness  Lymphatics - no palpable lymphadenopathy  Chest - clear to auscultation, no wheezes, rales or rhonchi, symmetric air entry, breath sounds are shallow. Heart - normal rate, regular rhythm, normal S1, S2, no murmurs, rubs, clicks or gallops, no JVD  Abdomen -the abdomen is large and expansive. There is a vertical scar in the periumbilical area without evidence of hernia. He is tender to palpation in the epigastrium and in the right upper quadrant. The epigastric tenderness is more pronounced in the right upper quadrant tenderness. There is mild rebound and guarding to both. No definite masses are palpable.   No CVA tenderness is present. Rectal: Not performed. Back exam -no kyphosis or scoliosis. Neurological - alert, oriented, normal speech, no focal findings or movement disorder noted  Musculoskeletal - no joint tenderness, deformity or swelling  Extremities - peripheral pulses normal, no pedal edema, no clubbing or cyanosis  Skin - normal coloration and turgor, no rashes, no suspicious skin lesions noted           DATA:    Hematology:  Recent Labs     11/19/20  1120   WBC 7.3   RBC 3.82*   HGB 13.3   HCT 38.4*   .5   MCH 34.8*   MCHC 34.6   RDW 13.1      MPV 9.2     Chemistry:  Recent Labs     11/19/20  1120   *   K 3.8   CL 97*   CO2 29   GLUCOSE 128*   BUN 12   CREATININE 0.62*   ANIONGAP 8*   LABGLOM >60   GFRAA >60   CALCIUM 9.2     Recent Labs     11/19/20  1120   PROT 7.6   LABALBU 4.3   AST 68*   ALT 28   ALKPHOS 98   BILITOT 1.91*   BILIDIR 0.88*   AMYLASE 20*   LIPASE 17     IMAGING:   Impression    1. No gallstones, negative sonographic Cedeño's sign but pericholecystic    fluid is present.  Acalculous cholecystitis could give this appearance. 2. Fatty infiltration of the liver        ASSESSMENT:   1.)  The clinical findings are suspicious for acalculous cholecystitis. Several findings are unusual however including lack of leukocytosis or elevation of the alkaline phosphatase. Mild elevation of the bilirubin would be quite consistent however with a gangrenous cholecystitis. His tenderness however appears to be more epigastric than right upper quadrant and so there is some doubt about the diagnosis. He has only had an ultrasound the gallbladder is imaging without CT scan or other findings to rule in or rule out pancreatitis with without elevation of amylase and lipase as these become somewhat of a consideration as well given the predominant epigastric nature of his pain.   However the primary concern remains acalculous cholecystitis  2.) Severe obesity  3.) Hypertension  4.) History

## 2020-11-19 NOTE — ED PROVIDER NOTES
The patient was seen and examined by me in conjunction with the mid-level provider. I agree with his/her assessment and treatment plan. The patient is found to have a calculus cholecystitis and his bilirubin is 1.9. He is being admitted.      Kane Boyd MD  11/19/20 4646

## 2020-11-19 NOTE — FLOWSHEET NOTE
Patient was resting as writer entered the room (spouse at bedside). Patient and spouse engaged in conversation. Patient shares he is still in pain (3 being his current pain level). Patient shares he will possible have a procedure on tomorrow, and prayerfully things will go well. Writer provided listening support and spiritual presence as spouse shares information about the patient's  Discomfort. Patient and spouse have a positive spirit and attitude, stating \"things will get better\" (in spite of everything that's happening in the world around them). Writer stated he will pray for a successful procedure and full recovery without any complications. The patient and spouse were grateful for the visit and prayers. There are no additional services need. Spiritual care will follow up as needed. 11/19/20 6021   Encounter Summary   Services provided to: Patient   Referral/Consult From: [facility]   Support System Spouse   Continue Visiting   (11/19/2020)   Complexity of Encounter Low   Length of Encounter 15 minutes   Spiritual Assessment Completed Yes   Routine   Type Initial   Assessment Calm; Approachable;Peaceful   Intervention Active listening;Discussed illness/injury and it's impact   Outcome Expressed gratitude;Engaged in conversation

## 2020-11-19 NOTE — H&P
[facility]  Office: [address], DO, Coltbertinzhen Edge, DO, Alina Quinn, DO, Briseyda Castro, DO, Brown Hale MD, Mani Pearson MD, Flip Pillai MD, Rosales Landaverde MD, Danielle Parks MD, Saranya So MD, Stephanie Alfred MD, Sarah Ortiz MD, Concha Bang MD, Franky Forrest DO, Patrice Barron MD, Yloande Livingston MD, Ant Olivo, DO, Rik Scott MD,  Trini Odell DO, Mary Dodge MD, Cristo Retana MD, Talha Magallon, [facility], [facility], CNP, Krys Walker, CNP, Robert Wasserman, CNS, Gregoria Morales, CNP, Mendoza Bronson, CNP, Constance Stiles, CNP, James Woody, CNP, Angélica Mg, CNP, Jeremiah Santos PA-C, Artemio Hood, [facility], [place], CNP, Tri Omer, CNP, Gloria Anne, CNP, Zaida Downs, CNP, Cecil Velázquez, [facility]    HISTORY AND PHYSICAL EXAMINATION            Date:   11/19/2020  Patient name:  Niranjan Guillen  Date of admission:  11/19/2020 10:44 AM  MRN:   [id number]  Account:  [de-identified]  YOB: 1955  PCP:    Christy Rodriguez MD  Room:   [place] [address]  Code Status:    No Order    Chief Complaint:     Chief Complaint   Patient presents with    Abdominal Pain     UPPER ABD PAIN  X 4 DAYS       History Obtained From:     patient    History of Present Illness:     Niranjan Guillen is a 63 y.o. Non-/non  male who presents with Abdominal Pain (UPPER ABD PAIN  X 4 DAYS)   and is admitted to the hospital for the management of Cholecystitis, acute. Patient reports that approximately 4 days ago he ate a large pepperoni pizza. Shortly thereafter patient developed nausea and epigastric discomfort. Patient has a history of GERD. Patient took his PPIs and OTC antacids without relief. Patient reports that any dietary intake results and nausea and and exacerbated of his RUQ pain.   Patient currently reports a pain at a 6/10 and describes it as stabbing in nature but waxes and wanes in intensity from a 4 to a 10. Patient still has his gallbladder and denies any previous gallbladder attacks. Emergency department evaluation reveals possible cholecystitis and patient will be admitted for further evaluation and treatment. Patient case was discussed with general surgery and the determination was made to admit the patient for IV antibiotics and continued imaging studies. Past Medical History:     Past Medical History:   Diagnosis Date    Colon cancer ([address]     Melanoma [facility])         Past Surgical History:     Past Surgical History:   Procedure Laterality Date    AV FISTULA REPAIR      COLONOSCOPY  06/06/2016    [facility]    HERNIA REPAIR      KNEE ARTHROSCOPY          Medications Prior to Admission:     Prior to Admission medications    Medication Sig Start Date End Date Taking? Authorizing Provider   omeprazole 20 MG EC tablet Take 20 mg by mouth daily    Yes Historical Provider, MD   bisoprolol-hydrochlorothiazide (ZIAC) 5-6.25 MG per tablet TAKE 1 TABLET DAILY (NEED AN APPOINTMENT FOR FURTHER REFILLS) 3/16/20   Christy Rodriguez MD   Omega-3 Fatty Acids (FISH OIL) 1000 MG CAPS Take 3,000 mg by mouth 3 times daily    Historical Provider, MD   Biotin 1000 MCG TABS Take by mouth    Historical Provider, MD   Potassium (POTASSIMIN PO) Take by mouth    Historical Provider, MD        Allergies:     Patient has no known allergies. Social History:     Tobacco:    reports that he quit smoking about 12 years ago. He has never used smokeless tobacco.  Alcohol:      reports current alcohol use of about 16.7 standard drinks of alcohol per week. Drug Use:  reports no history of drug use. Family History:     Family History   Problem Relation Age of Onset    COPD Mother     Cancer Father        Review of Systems:     Positive and Negative as described in HPI.     CONSTITUTIONAL:  negative for fevers, chills, sweats, fatigue, weight loss  HEENT:  negative for vision, hearing changes, nondistended, normal bowel sounds, no hepatomegaly or splenomegaly.   Endorses nausea following dietary intake  Neurologic: There are no new focal motor or sensory deficits, normal muscle tone and bulk, no abnormal sensation, normal speech, cranial nerves II through XII grossly intact  Skin: No gross lesions, rashes, bruising or bleeding on exposed skin area  Extremities:  peripheral pulses palpable, no pedal edema or calf pain with palpation  Psych: normal affect     Investigations:      Laboratory Testing:  Recent Results (from the past 24 hour(s))   Basic Metabolic Prof    Collection Time: 11/19/20 11:20 AM   Result Value Ref Range    Glucose 128 (H) 70 - 99 mg/dL    BUN 12 8 - 23 mg/dL    CREATININE 0.62 (L) 0.70 - 1.20 mg/dL    Bun/Cre Ratio 19 9 - 20    Calcium 9.2 8.6 - 10.4 mg/dL    Sodium 134 (L) 135 - 144 mmol/L    Potassium 3.8 3.7 - 5.3 mmol/L    Chloride 97 (L) 98 - 107 mmol/L    CO2 29 20 - 31 mmol/L    Anion Gap 8 (L) 9 - 17 mmol/L    GFR Non-African American >60 >60 mL/min    GFR African American >60 >60 mL/min    GFR Comment          GFR Staging NOT REPORTED    CBC with DIFF    Collection Time: 11/19/20 11:20 AM   Result Value Ref Range    WBC 7.3 3.5 - 11.3 k/uL    RBC 3.82 (L) 4.21 - 5.77 m/uL    Hemoglobin 13.3 13.0 - 17.0 g/dL    Hematocrit 38.4 (L) 40.7 - 50.3 %    .5 82.6 - 102.9 fL    MCH 34.8 (H) 25.2 - 33.5 pg    MCHC 34.6 28.4 - 34.8 g/dL    RDW 13.1 11.8 - 14.4 %    Platelets 693 778 - 257 k/uL    MPV 9.2 8.1 - 13.5 fL    NRBC Automated 0.0 0.0 per 100 WBC    Differential Type NOT REPORTED     Seg Neutrophils 80 (H) 36 - 65 %    Lymphocytes 11 (L) 24 - 43 %    Monocytes 9 3 - 12 %    Eosinophils % 0 (L) 1 - 4 %    Basophils 0 0 - 2 %    Immature Granulocytes 0 0 %    Segs Absolute 5.79 1.50 - 8.10 k/uL    Absolute Lymph # 0.79 (L) 1.10 - 3.70 k/uL    Absolute Mono # 0.62 0.10 - 1.20 k/uL    Absolute Eos # 0.03 0.00 - 0.44 k/uL    Basophils Absolute <0.03 0.00 - 0.20 k/uL    Absolute Immature Granulocyte 0.03 0.00 - 0.30 k/uL    WBC Morphology NOT REPORTED     RBC Morphology NOT REPORTED     Platelet Estimate NOT REPORTED    Liver Profile    Collection Time: 11/19/20 11:20 AM   Result Value Ref Range    Alb 4.3 3.5 - 5.2 g/dL    Alkaline Phosphatase 98 40 - 129 U/L    ALT 28 5 - 41 U/L    AST 68 (H) <40 U/L    Total Bilirubin 1.91 (H) 0.3 - 1.2 mg/dL    Bilirubin, Direct 0.88 (H) <0.31 mg/dL    Bilirubin, Indirect 1.03 (H) 0.00 - 1.00 mg/dL    Total Protein 7.6 6.4 - 8.3 g/dL    Globulin NOT REPORTED 1.5 - 3.8 g/dL    Albumin/Globulin Ratio NOT REPORTED 1.0 - 2.5   Lipase    Collection Time: 11/19/20 11:20 AM   Result Value Ref Range    Lipase 17 13 - 60 U/L   Amylase    Collection Time: 11/19/20 11:20 AM   Result Value Ref Range    Amylase 20 (L) 28 - 100 U/L   Lactate, Sepsis    Collection Time: 11/19/20  1:06 PM   Result Value Ref Range    Lactic Acid, Sepsis 1.0 0.5 - 1.9 mmol/L    Lactic Acid, Sepsis, Whole Blood NOT REPORTED 0.5 - 1.9 mmol/L   Lactate, Sepsis    Collection Time: 11/19/20  3:08 PM   Result Value Ref Range    Lactic Acid, Sepsis 0.9 0.5 - 1.9 mmol/L    Lactic Acid, Sepsis, Whole Blood NOT REPORTED 0.5 - 1.9 mmol/L       Imaging/Diagnostics:  Us Gallbladder Ruq    Result Date: 11/19/2020  1. No gallstones, negative sonographic Cedeño's sign but pericholecystic fluid is present. Acalculous cholecystitis could give this appearance. 2. Fatty infiltration of the liver       Assessment :      Hospital Problems           Last Modified POA    * (Principal) Cholecystitis, acute 11/19/2020 Yes    Essential hypertension 11/19/2020 Yes    History of colon cancer 11/19/2020 Yes    Obesity (BMI 35.0-39.9 without comorbidity) (Chronic) 11/19/2020 Yes    GERD (gastroesophageal reflux disease) 11/19/2020 Yes          Plan:     Patient status inpatient in the Med/Surge    1. Continue IV Zosyn  2. Pain control  3. N.p.o. status exceptions are ice chips  4.  General surgery

## 2020-11-19 NOTE — CARE COORDINATION
Case Management Initial Discharge Plan  Teena Villegas,         Readmission Risk              Risk of Unplanned Readmission:        0             Met with:patient to discuss discharge plans. Information verified: address, contacts, phone number, , insurance Yes  PCP: Keith Lundberg MD  Date of last visit: stated it has been a few years    Insurance Provider: lucille    Discharge Planning  Current Residence:   house  Living Arrangements:   Caterina Moraes has 1 stories/2 stairs to climb  Support Systems:   friend  Current Services PTA:   no Supplier: none  Patient able to perform ADL's:Independent  DME used to aid ambulation prior to admission: none  During admission: none    Potential Assistance Needed:   tbd    Pharmacy: [facility]   Potential Assistance Purchasing Medications:   no  Does patient want to participate in local refill/ meds to beds program?   no    Patient agreeable to home care: tbd  [place] of choice provided:  yes      Type of Home Care Services:     Patient expects to be discharged to:       Prior SNF/Rehab Placement and Facility: no  Agreeable to SNF/Rehab: No  [place] of choice provided: yes   Evaluation: yes    Expected Discharge date:   20  Follow Up Appointment: Best Day/ Time:      Transportation provider: friend  Transportation arrangements needed for discharge: No    Discharge Plan:   Patient lives at home alone in 1 story home with 2 steps to enter. Patient was independent and working. Patient stated he has been drinking a lot lately but stopped 4-5 days ago. He denied any drug use. SBIRT completed. Patient will be observation for Cholecystitis. Discharge needs tbd.         Electronically signed by TITUS Gallegos on 20 at 2:03 PM EST

## 2020-11-20 ENCOUNTER — ANESTHESIA EVENT (OUTPATIENT)
Dept: OPERATING ROOM | Age: 65
DRG: 415 | End: 2020-11-20
Payer: COMMERCIAL

## 2020-11-20 ENCOUNTER — APPOINTMENT (OUTPATIENT)
Dept: NUCLEAR MEDICINE | Age: 65
DRG: 415 | End: 2020-11-20
Payer: COMMERCIAL

## 2020-11-20 ENCOUNTER — ANESTHESIA (OUTPATIENT)
Dept: OPERATING ROOM | Age: 65
DRG: 415 | End: 2020-11-20
Payer: COMMERCIAL

## 2020-11-20 VITALS — SYSTOLIC BLOOD PRESSURE: 106 MMHG | TEMPERATURE: 100.9 F | DIASTOLIC BLOOD PRESSURE: 57 MMHG | OXYGEN SATURATION: 96 %

## 2020-11-20 LAB
ABSOLUTE EOS #: 0.06 K/UL (ref 0–0.44)
ABSOLUTE IMMATURE GRANULOCYTE: 0.05 K/UL (ref 0–0.3)
ABSOLUTE LYMPH #: 0.72 K/UL (ref 1.1–3.7)
ABSOLUTE MONO #: 0.75 K/UL (ref 0.1–1.2)
ALBUMIN SERPL-MCNC: 3.7 G/DL (ref 3.5–5.2)
ALBUMIN/GLOBULIN RATIO: ABNORMAL (ref 1–2.5)
ALP BLD-CCNC: 83 U/L (ref 40–129)
ALT SERPL-CCNC: 22 U/L (ref 5–41)
AMYLASE: 32 U/L (ref 28–100)
ANION GAP SERPL CALCULATED.3IONS-SCNC: 7 MMOL/L (ref 9–17)
AST SERPL-CCNC: 49 U/L
BASOPHILS # BLD: 0 % (ref 0–2)
BASOPHILS ABSOLUTE: <0.03 K/UL (ref 0–0.2)
BILIRUB SERPL-MCNC: 1.67 MG/DL (ref 0.3–1.2)
BILIRUBIN DIRECT: 0.76 MG/DL
BILIRUBIN, INDIRECT: 0.91 MG/DL (ref 0–1)
BUN BLDV-MCNC: 10 MG/DL (ref 8–23)
CALCIUM SERPL-MCNC: 8.8 MG/DL (ref 8.6–10.4)
CHLORIDE BLD-SCNC: 99 MMOL/L (ref 98–107)
CO2: 28 MMOL/L (ref 20–31)
CREAT SERPL-MCNC: 0.55 MG/DL (ref 0.7–1.2)
DIFFERENTIAL TYPE: ABNORMAL
EOSINOPHILS RELATIVE PERCENT: 1 % (ref 1–4)
GFR AFRICAN AMERICAN: >60 ML/MIN
GFR NON-AFRICAN AMERICAN: >60 ML/MIN
GFR SERPL CREATININE-BSD FRML MDRD: ABNORMAL ML/MIN/{1.73_M2}
GFR SERPL CREATININE-BSD FRML MDRD: ABNORMAL ML/MIN/{1.73_M2}
GLUCOSE BLD-MCNC: 144 MG/DL (ref 70–99)
HCT VFR BLD CALC: 36.1 % (ref 40.7–50.3)
HEMOGLOBIN: 12.2 G/DL (ref 13–17)
IMMATURE GRANULOCYTES: 1 %
INR BLD: 1.2
LIPASE: 33 U/L (ref 13–60)
LYMPHOCYTES # BLD: 10 % (ref 24–43)
MCH RBC QN AUTO: 34.7 PG (ref 25.2–33.5)
MCHC RBC AUTO-ENTMCNC: 33.8 G/DL (ref 28.4–34.8)
MCV RBC AUTO: 102.6 FL (ref 82.6–102.9)
MONOCYTES # BLD: 10 % (ref 3–12)
NRBC AUTOMATED: 0 PER 100 WBC
PDW BLD-RTO: 13.2 % (ref 11.8–14.4)
PLATELET # BLD: 130 K/UL (ref 138–453)
PLATELET ESTIMATE: ABNORMAL
PMV BLD AUTO: 9.3 FL (ref 8.1–13.5)
POTASSIUM SERPL-SCNC: 4.1 MMOL/L (ref 3.7–5.3)
PROTHROMBIN TIME: 15.2 SEC (ref 11.5–14.2)
RBC # BLD: 3.52 M/UL (ref 4.21–5.77)
RBC # BLD: ABNORMAL 10*6/UL
SARS-COV-2, RAPID: NOT DETECTED
SARS-COV-2: NORMAL
SARS-COV-2: NORMAL
SEG NEUTROPHILS: 78 % (ref 36–65)
SEGMENTED NEUTROPHILS ABSOLUTE COUNT: 5.69 K/UL (ref 1.5–8.1)
SODIUM BLD-SCNC: 134 MMOL/L (ref 135–144)
SOURCE: NORMAL
TOTAL PROTEIN: 6.9 G/DL (ref 6.4–8.3)
WBC # BLD: 7.3 K/UL (ref 3.5–11.3)
WBC # BLD: ABNORMAL 10*3/UL

## 2020-11-20 PROCEDURE — 82248 BILIRUBIN DIRECT: CPT

## 2020-11-20 PROCEDURE — 3600000013 HC SURGERY LEVEL 3 ADDTL 15MIN: Performed by: SURGERY

## 2020-11-20 PROCEDURE — 3430000000 HC RX DIAGNOSTIC RADIOPHARMACEUTICAL: Performed by: SURGERY

## 2020-11-20 PROCEDURE — 85610 PROTHROMBIN TIME: CPT

## 2020-11-20 PROCEDURE — 3700000001 HC ADD 15 MINUTES (ANESTHESIA): Performed by: SURGERY

## 2020-11-20 PROCEDURE — 78226 HEPATOBILIARY SYSTEM IMAGING: CPT

## 2020-11-20 PROCEDURE — 2580000003 HC RX 258: Performed by: NURSE ANESTHETIST, CERTIFIED REGISTERED

## 2020-11-20 PROCEDURE — 6360000002 HC RX W HCPCS: Performed by: SURGERY

## 2020-11-20 PROCEDURE — 3600000003 HC SURGERY LEVEL 3 BASE: Performed by: SURGERY

## 2020-11-20 PROCEDURE — 36415 COLL VENOUS BLD VENIPUNCTURE: CPT

## 2020-11-20 PROCEDURE — 7100000000 HC PACU RECOVERY - FIRST 15 MIN: Performed by: SURGERY

## 2020-11-20 PROCEDURE — C9113 INJ PANTOPRAZOLE SODIUM, VIA: HCPCS | Performed by: NURSE PRACTITIONER

## 2020-11-20 PROCEDURE — 0FT40ZZ RESECTION OF GALLBLADDER, OPEN APPROACH: ICD-10-PCS | Performed by: SURGERY

## 2020-11-20 PROCEDURE — 6360000002 HC RX W HCPCS: Performed by: NURSE PRACTITIONER

## 2020-11-20 PROCEDURE — U0002 COVID-19 LAB TEST NON-CDC: HCPCS

## 2020-11-20 PROCEDURE — 97161 PT EVAL LOW COMPLEX 20 MIN: CPT

## 2020-11-20 PROCEDURE — 80053 COMPREHEN METABOLIC PANEL: CPT

## 2020-11-20 PROCEDURE — 7100000001 HC PACU RECOVERY - ADDTL 15 MIN: Performed by: SURGERY

## 2020-11-20 PROCEDURE — 3700000000 HC ANESTHESIA ATTENDED CARE: Performed by: SURGERY

## 2020-11-20 PROCEDURE — 2720000010 HC SURG SUPPLY STERILE: Performed by: SURGERY

## 2020-11-20 PROCEDURE — 1200000000 HC SEMI PRIVATE

## 2020-11-20 PROCEDURE — 82150 ASSAY OF AMYLASE: CPT

## 2020-11-20 PROCEDURE — 2500000003 HC RX 250 WO HCPCS: Performed by: SURGERY

## 2020-11-20 PROCEDURE — A9537 TC99M MEBROFENIN: HCPCS | Performed by: SURGERY

## 2020-11-20 PROCEDURE — 2709999900 HC NON-CHARGEABLE SUPPLY: Performed by: SURGERY

## 2020-11-20 PROCEDURE — 2500000003 HC RX 250 WO HCPCS: Performed by: NURSE ANESTHETIST, CERTIFIED REGISTERED

## 2020-11-20 PROCEDURE — 83690 ASSAY OF LIPASE: CPT

## 2020-11-20 PROCEDURE — 88304 TISSUE EXAM BY PATHOLOGIST: CPT

## 2020-11-20 PROCEDURE — 2580000003 HC RX 258: Performed by: SURGERY

## 2020-11-20 PROCEDURE — 6360000002 HC RX W HCPCS: Performed by: NURSE ANESTHETIST, CERTIFIED REGISTERED

## 2020-11-20 PROCEDURE — 97116 GAIT TRAINING THERAPY: CPT

## 2020-11-20 PROCEDURE — G0378 HOSPITAL OBSERVATION PER HR: HCPCS

## 2020-11-20 PROCEDURE — 85025 COMPLETE CBC W/AUTO DIFF WBC: CPT

## 2020-11-20 PROCEDURE — 99232 SBSQ HOSP IP/OBS MODERATE 35: CPT | Performed by: NURSE PRACTITIONER

## 2020-11-20 RX ORDER — OXYCODONE HYDROCHLORIDE 5 MG/1
10 TABLET ORAL EVERY 6 HOURS PRN
Status: DISCONTINUED | OUTPATIENT
Start: 2020-11-20 | End: 2020-11-22 | Stop reason: HOSPADM

## 2020-11-20 RX ORDER — FENTANYL CITRATE 50 UG/ML
INJECTION, SOLUTION INTRAMUSCULAR; INTRAVENOUS PRN
Status: DISCONTINUED | OUTPATIENT
Start: 2020-11-20 | End: 2020-11-20 | Stop reason: SDUPTHER

## 2020-11-20 RX ORDER — PANTOPRAZOLE SODIUM 40 MG/10ML
40 INJECTION, POWDER, LYOPHILIZED, FOR SOLUTION INTRAVENOUS ONCE
Status: COMPLETED | OUTPATIENT
Start: 2020-11-20 | End: 2020-11-20

## 2020-11-20 RX ORDER — SUCCINYLCHOLINE/SOD CL,ISO/PF 100 MG/5ML
SYRINGE (ML) INTRAVENOUS PRN
Status: DISCONTINUED | OUTPATIENT
Start: 2020-11-20 | End: 2020-11-20 | Stop reason: SDUPTHER

## 2020-11-20 RX ORDER — ONDANSETRON 2 MG/ML
4 INJECTION INTRAMUSCULAR; INTRAVENOUS
Status: DISCONTINUED | OUTPATIENT
Start: 2020-11-20 | End: 2020-11-20 | Stop reason: HOSPADM

## 2020-11-20 RX ORDER — HYDROMORPHONE HCL 110MG/55ML
PATIENT CONTROLLED ANALGESIA SYRINGE INTRAVENOUS PRN
Status: DISCONTINUED | OUTPATIENT
Start: 2020-11-20 | End: 2020-11-20 | Stop reason: SDUPTHER

## 2020-11-20 RX ORDER — OXYCODONE HYDROCHLORIDE AND ACETAMINOPHEN 5; 325 MG/1; MG/1
1 TABLET ORAL
Status: DISCONTINUED | OUTPATIENT
Start: 2020-11-20 | End: 2020-11-20 | Stop reason: HOSPADM

## 2020-11-20 RX ORDER — ALBUTEROL SULFATE 90 UG/1
6 AEROSOL, METERED RESPIRATORY (INHALATION) EVERY 6 HOURS
Status: DISCONTINUED | OUTPATIENT
Start: 2020-11-20 | End: 2020-11-20

## 2020-11-20 RX ORDER — SODIUM CHLORIDE 9 MG/ML
INJECTION, SOLUTION INTRAVENOUS CONTINUOUS PRN
Status: DISCONTINUED | OUTPATIENT
Start: 2020-11-20 | End: 2020-11-20 | Stop reason: SDUPTHER

## 2020-11-20 RX ORDER — ONDANSETRON 2 MG/ML
INJECTION INTRAMUSCULAR; INTRAVENOUS PRN
Status: DISCONTINUED | OUTPATIENT
Start: 2020-11-20 | End: 2020-11-20 | Stop reason: SDUPTHER

## 2020-11-20 RX ORDER — HYDROMORPHONE HYDROCHLORIDE 1 MG/ML
1 INJECTION, SOLUTION INTRAMUSCULAR; INTRAVENOUS; SUBCUTANEOUS
Status: DISCONTINUED | OUTPATIENT
Start: 2020-11-20 | End: 2020-11-22 | Stop reason: HOSPADM

## 2020-11-20 RX ORDER — FENTANYL CITRATE 50 UG/ML
50 INJECTION, SOLUTION INTRAMUSCULAR; INTRAVENOUS EVERY 5 MIN PRN
Status: DISCONTINUED | OUTPATIENT
Start: 2020-11-20 | End: 2020-11-20 | Stop reason: HOSPADM

## 2020-11-20 RX ORDER — LIDOCAINE HYDROCHLORIDE 20 MG/ML
INJECTION, SOLUTION EPIDURAL; INFILTRATION; INTRACAUDAL; PERINEURAL PRN
Status: DISCONTINUED | OUTPATIENT
Start: 2020-11-20 | End: 2020-11-20 | Stop reason: SDUPTHER

## 2020-11-20 RX ORDER — ONDANSETRON 2 MG/ML
4 INJECTION INTRAMUSCULAR; INTRAVENOUS EVERY 6 HOURS PRN
Status: DISCONTINUED | OUTPATIENT
Start: 2020-11-20 | End: 2020-11-22 | Stop reason: HOSPADM

## 2020-11-20 RX ORDER — DEXTROSE, SODIUM CHLORIDE, AND POTASSIUM CHLORIDE 5; .45; .15 G/100ML; G/100ML; G/100ML
INJECTION INTRAVENOUS CONTINUOUS
Status: DISCONTINUED | OUTPATIENT
Start: 2020-11-20 | End: 2020-11-22 | Stop reason: HOSPADM

## 2020-11-20 RX ORDER — FENTANYL CITRATE 50 UG/ML
25 INJECTION, SOLUTION INTRAMUSCULAR; INTRAVENOUS EVERY 5 MIN PRN
Status: DISCONTINUED | OUTPATIENT
Start: 2020-11-20 | End: 2020-11-20 | Stop reason: HOSPADM

## 2020-11-20 RX ORDER — SODIUM CHLORIDE 0.9 % (FLUSH) 0.9 %
10 SYRINGE (ML) INJECTION EVERY 12 HOURS SCHEDULED
Status: DISCONTINUED | OUTPATIENT
Start: 2020-11-20 | End: 2020-11-22 | Stop reason: HOSPADM

## 2020-11-20 RX ORDER — PHENYLEPHRINE HCL IN 0.9% NACL 1 MG/10 ML
SYRINGE (ML) INTRAVENOUS PRN
Status: DISCONTINUED | OUTPATIENT
Start: 2020-11-20 | End: 2020-11-20 | Stop reason: SDUPTHER

## 2020-11-20 RX ORDER — SODIUM CHLORIDE 0.9 % (FLUSH) 0.9 %
10 SYRINGE (ML) INJECTION PRN
Status: DISCONTINUED | OUTPATIENT
Start: 2020-11-20 | End: 2020-11-22 | Stop reason: HOSPADM

## 2020-11-20 RX ORDER — OXYCODONE HYDROCHLORIDE 5 MG/1
5 TABLET ORAL EVERY 6 HOURS PRN
Status: DISCONTINUED | OUTPATIENT
Start: 2020-11-20 | End: 2020-11-22 | Stop reason: HOSPADM

## 2020-11-20 RX ORDER — ROCURONIUM BROMIDE 10 MG/ML
INJECTION, SOLUTION INTRAVENOUS PRN
Status: DISCONTINUED | OUTPATIENT
Start: 2020-11-20 | End: 2020-11-20 | Stop reason: SDUPTHER

## 2020-11-20 RX ORDER — PROPOFOL 10 MG/ML
INJECTION, EMULSION INTRAVENOUS PRN
Status: DISCONTINUED | OUTPATIENT
Start: 2020-11-20 | End: 2020-11-20 | Stop reason: SDUPTHER

## 2020-11-20 RX ADMIN — PIPERACILLIN AND TAZOBACTAM 3.38 G: 3; .375 INJECTION, POWDER, LYOPHILIZED, FOR SOLUTION INTRAVENOUS at 03:07

## 2020-11-20 RX ADMIN — SUGAMMADEX 200 MG: 100 INJECTION, SOLUTION INTRAVENOUS at 16:46

## 2020-11-20 RX ADMIN — Medication 100 MCG: at 15:25

## 2020-11-20 RX ADMIN — Medication 2 MILLICURIE: at 10:20

## 2020-11-20 RX ADMIN — PROPOFOL 200 MG: 10 INJECTION, EMULSION INTRAVENOUS at 14:42

## 2020-11-20 RX ADMIN — Medication 100 MCG: at 15:28

## 2020-11-20 RX ADMIN — SODIUM CHLORIDE: 9 INJECTION, SOLUTION INTRAVENOUS at 14:37

## 2020-11-20 RX ADMIN — Medication 100 MCG: at 15:22

## 2020-11-20 RX ADMIN — PIPERACILLIN AND TAZOBACTAM 3.38 G: 3; .375 INJECTION, POWDER, LYOPHILIZED, FOR SOLUTION INTRAVENOUS at 11:25

## 2020-11-20 RX ADMIN — PANTOPRAZOLE SODIUM 40 MG: 40 INJECTION, POWDER, FOR SOLUTION INTRAVENOUS at 06:12

## 2020-11-20 RX ADMIN — ROCURONIUM BROMIDE 10 MG: 10 INJECTION, SOLUTION INTRAVENOUS at 14:42

## 2020-11-20 RX ADMIN — Medication 150 MCG: at 14:53

## 2020-11-20 RX ADMIN — ROCURONIUM BROMIDE 20 MG: 10 INJECTION, SOLUTION INTRAVENOUS at 14:57

## 2020-11-20 RX ADMIN — Medication 100 MCG: at 15:31

## 2020-11-20 RX ADMIN — HYDROMORPHONE HYDROCHLORIDE 1 MG: 2 INJECTION INTRAMUSCULAR; INTRAVENOUS; SUBCUTANEOUS at 15:58

## 2020-11-20 RX ADMIN — ROCURONIUM BROMIDE 20 MG: 10 INJECTION, SOLUTION INTRAVENOUS at 15:42

## 2020-11-20 RX ADMIN — Medication 100 MCG: at 15:04

## 2020-11-20 RX ADMIN — LIDOCAINE HYDROCHLORIDE 80 MG: 20 INJECTION, SOLUTION EPIDURAL; INFILTRATION; INTRACAUDAL; PERINEURAL at 14:42

## 2020-11-20 RX ADMIN — MORPHINE SULFATE 4 MG: 4 INJECTION, SOLUTION INTRAMUSCULAR; INTRAVENOUS at 10:30

## 2020-11-20 RX ADMIN — POTASSIUM CHLORIDE, DEXTROSE MONOHYDRATE AND SODIUM CHLORIDE: 150; 5; 450 INJECTION, SOLUTION INTRAVENOUS at 12:34

## 2020-11-20 RX ADMIN — HYDROMORPHONE HYDROCHLORIDE 1 MG: 2 INJECTION INTRAMUSCULAR; INTRAVENOUS; SUBCUTANEOUS at 15:18

## 2020-11-20 RX ADMIN — HYDROMORPHONE HYDROCHLORIDE 0.5 MG: 1 INJECTION, SOLUTION INTRAMUSCULAR; INTRAVENOUS; SUBCUTANEOUS at 21:32

## 2020-11-20 RX ADMIN — Medication 100 MCG: at 15:30

## 2020-11-20 RX ADMIN — Medication 100 MG: at 14:42

## 2020-11-20 RX ADMIN — ROCURONIUM BROMIDE 10 MG: 10 INJECTION, SOLUTION INTRAVENOUS at 16:13

## 2020-11-20 RX ADMIN — Medication 100 MCG: at 16:20

## 2020-11-20 RX ADMIN — Medication 100 MCG: at 15:01

## 2020-11-20 RX ADMIN — Medication 100 MCG: at 16:32

## 2020-11-20 RX ADMIN — Medication 150 MCG: at 14:58

## 2020-11-20 RX ADMIN — SODIUM CHLORIDE: 9 INJECTION, SOLUTION INTRAVENOUS at 15:36

## 2020-11-20 RX ADMIN — SODIUM CHLORIDE: 9 INJECTION, SOLUTION INTRAVENOUS at 16:30

## 2020-11-20 RX ADMIN — Medication 6 MILLICURIE: at 09:08

## 2020-11-20 RX ADMIN — Medication 100 MCG: at 16:40

## 2020-11-20 RX ADMIN — DEXTROSE MONOHYDRATE, SODIUM CHLORIDE, AND POTASSIUM CHLORIDE: 50; 4.5; 1.49 INJECTION, SOLUTION INTRAVENOUS at 18:06

## 2020-11-20 RX ADMIN — Medication 100 MCG: at 16:18

## 2020-11-20 RX ADMIN — ONDANSETRON 4 MG: 2 INJECTION, SOLUTION INTRAMUSCULAR; INTRAVENOUS at 16:24

## 2020-11-20 RX ADMIN — ROCURONIUM BROMIDE 20 MG: 10 INJECTION, SOLUTION INTRAVENOUS at 15:04

## 2020-11-20 RX ADMIN — FAMOTIDINE 20 MG: 10 INJECTION, SOLUTION INTRAVENOUS at 20:08

## 2020-11-20 RX ADMIN — Medication 50 MCG: at 14:42

## 2020-11-20 RX ADMIN — PIPERACILLIN SODIUM AND TAZOBACTAM SODIUM 4.5 G: 4; .5 INJECTION, POWDER, LYOPHILIZED, FOR SOLUTION INTRAVENOUS at 20:09

## 2020-11-20 ASSESSMENT — PULMONARY FUNCTION TESTS
PIF_VALUE: 27
PIF_VALUE: 31
PIF_VALUE: 14
PIF_VALUE: 32
PIF_VALUE: 30
PIF_VALUE: 23
PIF_VALUE: 32
PIF_VALUE: 30
PIF_VALUE: 31
PIF_VALUE: 32
PIF_VALUE: 31
PIF_VALUE: 28
PIF_VALUE: 28
PIF_VALUE: 32
PIF_VALUE: 32
PIF_VALUE: 19
PIF_VALUE: 30
PIF_VALUE: 31
PIF_VALUE: 30
PIF_VALUE: 32
PIF_VALUE: 31
PIF_VALUE: 24
PIF_VALUE: 31
PIF_VALUE: 28
PIF_VALUE: 24
PIF_VALUE: 32
PIF_VALUE: 31
PIF_VALUE: 30
PIF_VALUE: 30
PIF_VALUE: 26
PIF_VALUE: 19
PIF_VALUE: 31
PIF_VALUE: 22
PIF_VALUE: 31
PIF_VALUE: 32
PIF_VALUE: 6
PIF_VALUE: 22
PIF_VALUE: 32
PIF_VALUE: 31
PIF_VALUE: 31
PIF_VALUE: 1
PIF_VALUE: 27
PIF_VALUE: 28
PIF_VALUE: 32
PIF_VALUE: 30
PIF_VALUE: 31
PIF_VALUE: 30
PIF_VALUE: 16
PIF_VALUE: 22
PIF_VALUE: 30
PIF_VALUE: 30
PIF_VALUE: 31
PIF_VALUE: 27
PIF_VALUE: 19
PIF_VALUE: 29
PIF_VALUE: 31
PIF_VALUE: 26
PIF_VALUE: 25
PIF_VALUE: 30
PIF_VALUE: 6
PIF_VALUE: 31
PIF_VALUE: 31
PIF_VALUE: 24
PIF_VALUE: 30
PIF_VALUE: 1
PIF_VALUE: 31
PIF_VALUE: 31
PIF_VALUE: 29
PIF_VALUE: 30
PIF_VALUE: 31
PIF_VALUE: 27
PIF_VALUE: 32
PIF_VALUE: 30
PIF_VALUE: 30
PIF_VALUE: 31
PIF_VALUE: 29
PIF_VALUE: 32
PIF_VALUE: 32
PIF_VALUE: 28
PIF_VALUE: 30
PIF_VALUE: 31
PIF_VALUE: 31
PIF_VALUE: 35
PIF_VALUE: 23
PIF_VALUE: 29
PIF_VALUE: 31
PIF_VALUE: 31
PIF_VALUE: 32
PIF_VALUE: 28
PIF_VALUE: 31
PIF_VALUE: 25
PIF_VALUE: 31
PIF_VALUE: 27
PIF_VALUE: 30
PIF_VALUE: 31
PIF_VALUE: 31
PIF_VALUE: 32
PIF_VALUE: 27
PIF_VALUE: 0
PIF_VALUE: 30
PIF_VALUE: 31
PIF_VALUE: 33
PIF_VALUE: 25
PIF_VALUE: 29
PIF_VALUE: 31
PIF_VALUE: 22
PIF_VALUE: 29
PIF_VALUE: 1
PIF_VALUE: 2
PIF_VALUE: 31
PIF_VALUE: 30
PIF_VALUE: 31
PIF_VALUE: 30
PIF_VALUE: 1
PIF_VALUE: 32
PIF_VALUE: 14
PIF_VALUE: 31
PIF_VALUE: 28
PIF_VALUE: 26
PIF_VALUE: 0
PIF_VALUE: 27
PIF_VALUE: 33
PIF_VALUE: 31
PIF_VALUE: 15
PIF_VALUE: 28
PIF_VALUE: 31
PIF_VALUE: 24
PIF_VALUE: 31
PIF_VALUE: 33
PIF_VALUE: 26
PIF_VALUE: 24
PIF_VALUE: 30
PIF_VALUE: 31
PIF_VALUE: 28
PIF_VALUE: 30
PIF_VALUE: 30
PIF_VALUE: 31

## 2020-11-20 ASSESSMENT — PAIN DESCRIPTION - LOCATION
LOCATION: ABDOMEN
LOCATION: ABDOMEN

## 2020-11-20 ASSESSMENT — PAIN DESCRIPTION - PROGRESSION
CLINICAL_PROGRESSION: NOT CHANGED
CLINICAL_PROGRESSION: GRADUALLY WORSENING

## 2020-11-20 ASSESSMENT — PAIN DESCRIPTION - ORIENTATION
ORIENTATION: RIGHT;LOWER
ORIENTATION: RIGHT;LOWER

## 2020-11-20 ASSESSMENT — PAIN SCALES - GENERAL
PAINLEVEL_OUTOF10: 3
PAINLEVEL_OUTOF10: 6
PAINLEVEL_OUTOF10: 6
PAINLEVEL_OUTOF10: 0

## 2020-11-20 ASSESSMENT — PAIN DESCRIPTION - FREQUENCY
FREQUENCY: CONTINUOUS
FREQUENCY: CONTINUOUS

## 2020-11-20 ASSESSMENT — PAIN DESCRIPTION - ONSET
ONSET: ON-GOING
ONSET: ON-GOING

## 2020-11-20 ASSESSMENT — PAIN DESCRIPTION - PAIN TYPE
TYPE: SURGICAL PAIN
TYPE: SURGICAL PAIN

## 2020-11-20 ASSESSMENT — PAIN - FUNCTIONAL ASSESSMENT
PAIN_FUNCTIONAL_ASSESSMENT: ACTIVITIES ARE NOT PREVENTED
PAIN_FUNCTIONAL_ASSESSMENT: ACTIVITIES ARE NOT PREVENTED

## 2020-11-20 ASSESSMENT — PAIN DESCRIPTION - DESCRIPTORS
DESCRIPTORS: ACHING;DISCOMFORT
DESCRIPTORS: ACHING;DISCOMFORT

## 2020-11-20 NOTE — PROGRESS NOTES
Returned to room 2003 from PACU via bed, abd dressing dry, intact. PARUL drain in place, bilat SCD's on. Wife at bedside briefly. betsey few ice chips.  Denies discomfort at present

## 2020-11-20 NOTE — ANESTHESIA POSTPROCEDURE EVALUATION
Department of Anesthesiology  Postprocedure Note    Patient: Kathy Contreras  MRN: [id number]  YOB: 1955  Date of evaluation: 11/20/2020  Time:  5:17 PM     Procedure Summary     Date:  11/20/20 Room / Location:  [address] [place] [address] / [facility] - INPATIENT    Anesthesia Start:  0973 Anesthesia Stop:  0337    Procedure:  CHOLECYSTECTOMY LAPAROSCOPIC , CONVERTED TO OPEN CHOLECYSTECTOMY (N/A Abdomen) Diagnosis:  (cholecystitis)    Surgeon:  Ashley Cullen MD Responsible Provider:  Jumana Lopez MD    Anesthesia Type:  general ASA Status:  3          Anesthesia Type: general    Alex Phase I:      Alex Phase II:      Last vitals: Reviewed and per EMR flowsheets.        Anesthesia Post Evaluation    Complications: no

## 2020-11-20 NOTE — PROGRESS NOTES
Physical Therapy    Facility/Department: [facility] MED SURG  Initial Assessment    NAME: Timothy Dunn  : 1955  MRN: [id number]    Date of Service: 2020    Discharge Recommendations:  Home independently        Assessment   Body structures, Functions, Activity limitations: Decreased functional mobility   Assessment: Patient independent with gait and transfers, going to surgery for gallbladder, instructed patient that we will check back tomorrow after surgery and if he continues to be independent will d/c. Recommend D/C home with family. Prognosis: Good  Decision Making: Low Complexity  PT Education: Goals;PT Role;Plan of Care  REQUIRES PT FOLLOW UP: Yes  Activity Tolerance  Activity Tolerance: Patient Tolerated treatment well       Patient Diagnosis(es): The encounter diagnosis was Cholecystitis. has a past medical history of Colon cancer ([facility] and Melanoma ([facility] has a past surgical history that includes Knee arthroscopy; AV fistula repair; hernia repair; Colonoscopy (2016); and Colon surgery.     Restrictions     Vision/Hearing  Vision: Impaired  Vision Exceptions: Wears glasses for reading  Hearing: Within functional limits     Subjective  General  Chart Reviewed: Yes  Patient assessed for rehabilitation services?: Yes  General Comment  Comments: NEHA Read states patient appropriate for PT, going to surgery this pm to have gallbladder removed  Subjective  Subjective: patietn pleasant and agreeable to PT          Orientation  Orientation  Overall Orientation Status: Within Functional Limits  Social/Functional History  Social/Functional History  Lives With: Significant other  Home Layout: One level  Home Access: Stairs to enter with rails  Entrance Stairs - Number of Steps: 3  Entrance Stairs - Rails: Left  Bathroom Shower/Tub: Tub/Shower unit  Bathroom Toilet: Standard  ADL Assistance: Independent  Homemaking Assistance: Independent  Ambulation Assistance: Independent  Transfer Assistance:

## 2020-11-20 NOTE — ANESTHESIA PRE PROCEDURE
acetaminophen (TYLENOL) suppository 650 mg  650 mg Rectal Q6H PRN Donnald Casa, APRN - NP        polyethylene glycol (GLYCOLAX) packet 17 g  17 g Oral Daily PRN Donnald Casa, APRN - NP        promethazine (PHENERGAN) tablet 12.5 mg  12.5 mg Oral Q6H PRN Donnald Casa, APRN - NP        Or    ondansetron [facility]) injection 4 mg  4 mg Intravenous Q6H PRN Donnald Casa, APRN - NP        nicotine (NICODERM CQ) 21 MG/24HR 1 patch  1 patch Transdermal Daily PRN Donnald Casa, APRN - NP        morphine (PF) injection 2 mg  2 mg Intravenous Q2H PRN Donnald Casa, APRN - NP        Or   Kilo Rudder morphine sulfate (PF) injection 4 mg  4 mg Intravenous Q2H PRN Donnald Casa, APRN - NP   4 mg at 20 1030    piperacillin-tazobactam (ZOSYN) 3.375 g in dextrose 5 % 50 mL IVPB extended infusion (mini-bag)  3.375 g Intravenous Q8H Loulou Bravo MD 12.5 mL/hr at 20 1125 3.375 g at 20 1125    dextrose 5 % and 0.45 % NaCl with KCl 20 mEq infusion   Intravenous Continuous Loulou Bravo  mL/hr at 20 1234         Allergies:  No Known Allergies    Problem List:    Patient Active Problem List   Diagnosis Code    Dyslipidemia E78.5    Essential hypertension I10    Cholecystitis K81.9    History of colon cancer Z85.038    Obesity (BMI 35.0-39.9 without comorbidity) E66.9    GERD (gastroesophageal reflux disease) K21.9       Past Medical History:        Diagnosis Date    [facility] ([facility]     Melanoma ([facility]        Past Surgical History:        Procedure Laterality Date    AV FISTULA REPAIR      COLON SURGERY      COLONOS[address]  [address]    [address]      KNEE ARTHROSCOPY         Social History:    Social History     Tobacco Use    Smoking status: Former Smoker     Last attempt to quit: 2007     Years since quittin.9    Smokeless tobacco: Never Used   Substance Use Topics    Alcohol use:  Yes     Alcohol/week: 20.0 standard drinks     Types: 20 Standard drinks or equivalent per week     Comment: Per pt \"4 shots of liquor every day pretty much\"                                Counseling given: Not Answered      Vital Signs (Current):   Vitals:    11/20/20 0000 11/20/20 0033 11/20/20 0331 11/20/20 0819   BP: 127/73  130/70 126/74   Pulse: 85  83 82   Resp: 18  20 15   Temp: 98.4 °F (36.9 °C)  98.2 °F (36.8 °C) 99 °F (37.2 °C)   TempSrc: Oral  Oral Oral   SpO2: 93%  93% 93%   Weight:  280 lb 1.6 oz (127.1 kg)     Height:                                                  BP Readings from Last 3 Encounters:   11/20/20 126/74   12/18/17 136/78   06/13/17 122/74       NPO Status: Time of last liquid consumption: 2306                        Time of last solid consumption: 1400                        Date of last liquid consumption: 11/19/20                        Date of last solid food consumption: 11/18/20    BMI:   Wt Readings from Last 3 Encounters:   11/20/20 280 lb 1.6 oz (127.1 kg)   12/18/17 254 lb (115.2 kg)   06/13/17 295 lb (133.8 kg)     Body mass index is 41.36 kg/m². CBC:   Lab Results   Component Value Date    WBC 7.3 11/20/2020    RBC 3.52 11/20/2020    HGB 12.2 11/20/2020    HCT 36.1 11/20/2020    .6 11/20/2020    RDW 13.2 11/20/2020     11/20/2020       CMP:   Lab Results   Component Value Date     11/20/2020    K 4.1 11/20/2020    CL 99 11/20/2020    CO2 28 11/20/2020    BUN 10 11/20/2020    CREATININE 0.55 11/20/2020    GFRAA >60 11/20/2020    LABGLOM >60 11/20/2020    GLUCOSE 144 11/20/2020    PROT 6.9 11/20/2020    CALCIUM 8.8 11/20/2020    BILITOT 1.67 11/20/2020    ALKPHOS 83 11/20/2020    AST 49 11/20/2020    ALT 22 11/20/2020       POC Tests: No results for input(s): POCGLU, POCNA, POCK, POCCL, POCBUN, POCHEMO, POCHCT in the last 72 hours.     Coags:   Lab Results   Component Value Date    PROTIME 15.2 11/20/2020    INR 1.2 11/20/2020       HCG (If Applicable): No results found for: PREGTESTUR, PREGSERUM, HCG, HCGQUANT ABGs: No results found for: PHART, PO2ART, TPD9PMS, GCI5XQC, BEART, Q3FAFJSO     Type & Screen (If Applicable):  No results found for: LABABO, LABRH    Drug/Infectious Status (If Applicable):  No results found for: HIV, HEPCAB    COVID-19 Screening (If Applicable):   Lab Results   Component Value Date    COVID19 Not Detected 11/20/2020         Anesthesia Evaluation    Airway: Mallampati: I  TM distance: >3 FB   Neck ROM: full  Mouth opening: > = 3 FB Dental:          Pulmonary:                              Cardiovascular:    (+) hypertension:,     (-)  angina                Neuro/Psych:               GI/Hepatic/Renal:             Endo/Other:                     Abdominal:           Vascular:                                        Anesthesia Plan      general     ASA 3             Anesthetic plan and risks discussed with patient.                       Reyna Myles MD   11/20/2020

## 2020-11-20 NOTE — OP NOTE
Operative Note      Patient: Leia Gavin  YOB: 1955  MRN: [id number]    Date of Procedure: 11/20/2020    Pre-Op Diagnosis: cholecystitis, acute, acalculous    Post-Op Diagnosis: Same       Procedure(s):  CHOLECYSTECTOMY LAPAROSCOPIC , CONVERTED TO OPEN CHOLECYSTECTOMY    Surgeon(s):  Marisa Rojas MD    Assistant:   Surgical Assistant: Mouna Mckeon    Anesthesia: General    Estimated Blood Loss (mL): 75 ml    Complications: None    Specimens:   ID Type Source Tests Collected by Time Destination   A : GALLBLADDER Tissue Gallbladder SURGICAL PATHOLOGY Marisa Rojas MD 11/20/2020 1620          Drains:   Closed/Suction Drain Right RUQ 7 Lao (Active)       Urethral Catheter Double-lumen;Non-latex 16 fr (Active)     Indications and Consent: This is a 73-year-old male who was admitted through the emergency room on November 19 with rather severe acute epigastric and right upper quadrant pain. Work-up showed a distended gallbladder with pericholecystic fluid but no gallstones. He had a slightly elevated bilirubin with normal liver function tests and a normal amylase. Acute acalculous cholecystitis was suspected. He was started on IV antibiotics and HIDA scan was performed. After about 12 hours in the hospital his symptoms improved considerably. HIDA scan done the morning of surgery showed nonvisualization of the gallbladder with normal hepatic and biliary ductal and gastrointestinal tract visualization. He was recommended to him that he proceed to cholecystectomy and a laparoscopic approach with possible conversion to open procedure was discussed and described with him. Patient is a good understanding of the indications and the risks and agrees to the procedure. Findings: It was possible to place a Arias balloon trocar in the epigastrium using standard open technique.   There were extensive adhesions to the midline but it was possible to get another 5 mm port in place in the right mid abdomen. In doing this it was found that the gallbladder could not be exposed because the omentum and hepatic flexure of the colon was densely adherent to the gallbladder and liver in this area. Therefore the procedure was converted to open. He was found to have acute gangrenous cholecystitis. An open cholecystectomy was performed and a 7 mm Jules-Narayan drain was left in the gallbladder bed. The gallbladder was opened during the dissection, which was taken down from above. There is not appear to be any stones within the gallbladder. Gallbladder was gangrenous in appearance. Detailed Description of Procedure: With the patient supine on the operating table and under general anesthesia the abdomen was prepped and draped in usual sterile fashion. Haley catheter was in place. The patient completed his scheduled Zosyn dose just prior to the induction of anesthesia and therefore no additional antibiotics were given. .  EPC cuffs were placed just prior to the induction of anesthesia and utilized throughout the procedure for DVT prophylaxis. Because he had prior intraperitoneal mesh repairs of a midline ventral hernia in the periumbilical and epigastric area laparoscopic access was obtained with a standard open technique making a transverse skin incision in the high epigastrium. A 12 mm Arias balloon trocar was inserted and pneumoperitoneum was established. He could be seen at their were extensive adhesions of omentum up to the midline and extending towards the right lateral abdomen. However the liver edge could be seen but the gallbladder could not be visualized initially. A 5 mm trocar was placed in the right lateral abdomen and an attempt was made to bluntly dissect the omentum and hepatic flexure off of the liver edge and gallbladder and this could not even be approached to the degree of visualizing the gallbladder fundus.   Therefore the trochars were removed and decision was made to proceed with an open procedure. Once the laparoscopic instruments had been removed from the field and open instruments were counted and brought to the field then a right subcostal skin incision was made starting in the midline where the trocar had been placed and extending laterally. Cautery was used to divide and control the rectus muscle and the incision extended the full width of the rectus muscle and only about a centimeter or so beyond into the lateral muscles. When the abdomen was entered findings were confirmed from what was seen laparoscopically. The liver showed rather advanced hepatic steatosis and was somewhat firm to palpation as well. By blunt dissection the fundus of the gallbladder could be exposed but the body and neck of the gallbladder still was adherent to the overlying hepatic flexure and omentum. The gallbladder was very distended edematous and dark purple in color suggesting underlying gangrenous cholecystitis. A Bookwalter retractor was placed to gain exposure to the right upper quadrant and by careful dissection the hepatic flexure and omentum was . The neck of the gallbladder could be seen but the tissue in this area was not easily dissected. It was decided to take the gallbladder down from superior to inferior starting at the fundus. Therefore the thick peritoneal peel was opened with the cautery around the fundus near the junction with the gallbladder bed and then by blunt dissection the posterior wall the gallbladder was identified and dissection was continued posteriorly working down towards the neck of the gallbladder and area of the cruz hepatis. LigaSure device was used to divide the thick peritoneal covering and combination of sharp and blunt dissection used to separate the gallbladder posteriorly from the liver bed. The gallbladder wall was gangrenous and friable. It was opened and was seen to contain mucus and dark reddish bile but no stones.   Finger was placed within the gallbladder lumen to aid in dissection and this was very carefully freed off of the gallbladder bed until the gallbladder remained attached only by its outlet. The cystic artery could be identified and was divided between 2 ties of 2-0 silk. Then the taper the gallbladder outlet could be appreciated and could be dissected down to just where the cystic duct seem to have its origin. It was decided to amputate the gallbladder at this level so as to stay far away from the area of the cruz hepatis and potential junction with the common duct. Accordingly this area was isolated and was then clamped with a right angle clamp and the gallbladder was amputated at what I believe is still a part of the gallbladder outlet. Then the contents of the clamp were suture ligated with a Anselmo suture of 2-0 PDS. The right upper quadrant was thoroughly irrigated with warm saline solution. Gloves were changed. The gallbladder bed was inspected and there was good hemostasis. The closure of the stump of the gallbladder/cystic duct showed no bile leak. All sponges and retractors were removed and sponge needle and instrument counts were reported as correct. A 7 mm Jules-Narayan drain was then placed through the 5 mm trocar site that had previously been placed in the right lateral abdomen and was brought to lay along the gallbladder bed with the tip of the sponge deep to the closure of the gallbladder outlet and cystic artery. The fascia and subcutaneous tissue was then irrigated with saline solution containing gentamicin. The wound was closed with a running layer of 0 PDS for the posterior fascia and peritoneum. The linea alba and anterior rectus fascia and external oblique fascia was closed with a running suture of 0 Prolene. The skin was approximated with surgical staples and half-inch plain gauze was packed into the subcutaneous space between every third or fourth staple to allow for drainage of the subcutaneous tissue. Sterile dressings were applied. The drain was sewn to the skin with 3-0 nylon and connected to bulb suction.     The patient tolerated the procedure well and was transferred to the postoperative care area stable and in good condition with plans to return to his hospital bed.     electronically signed by Nani Sargent MD on 11/20/2020 at 5:05 PM

## 2020-11-20 NOTE — PROGRESS NOTES
11/20/2020  1:36 PM  Surgery    T 99  P 82  BP  126/74  CC: epigastric and RUQ pain    Today he denies abdominal pain or nausea. HIDA scan just recently completed and is non-visualizing regarding gallbladder. Normal imaging of liver and GI tract. Findings c/w cholecystitis. HIDA 11/20/2020:  FINDINGS:    There was satisfactory uptake of radiopharmaceutical by the liver.  The bile    duct and bowel were seen in the expected period of time and sequence.  During    the initial 60 minutes, the gallbladder is not visualized.  Following    morphine, additional images at 30 minutes demonstrate no activity within the    gallbladder.              Impression    Nonvisualization of the gallbladder including post morphine imaging. Findings are consistent with acute cholecystitis. Today's labs are similar to yesterday. WBC is 7,300 with mild left shift. Total bilirubin 1.67. AST 49  ALT 22  Alk Phos 83. IMPRESSION: Acute, possibly acalculous cholecystitis. PLAN: I have recommended proceding to cholecystectomy. I was able to find old operative notes from [address] [place] [address] in [employer] system. I find that he has two pieces of intra-peritoneal mesh in place from prior midline hernia repairs, most rececent was 20 x 15 cm Parietene DS. Some of a prior mesh may have been excised at that time. I informed the patient that laparoscopic access will be an issue due to his intra-peritoneal mesh used in prior herniorhaphy. I will attempt to obtain laparoscopic access but the possibility he may require and open procedure is significant. Consent for cholecystectomy was discussed and signed. OR later this afternoon.     Valerie Leonard MD

## 2020-11-20 NOTE — PLAN OF CARE
Problem: Falls - Risk of:  Goal: Will remain free from falls  Description: Will remain free from falls  Outcome: Ongoing  Note: Siderails up x 2  Hourly rounding  Call light in reach  Remains free from falls and accidental injury at this time   Floor free from obstacles  Bed is locked and in lowest position  Adequate lighting provided  Patient independent. Gait steady. Goal: Absence of physical injury  Description: Absence of physical injury  Outcome: Ongoing     Problem: Pain:  Goal: Pain level will decrease  Description: Pain level will decrease  Outcome: Ongoing  Note: Pain level assessment complete.    Patient educated on pain scale and control interventions  PRN pain medication given per patient request-Morphine  Patient instructed to call out with new onset of pain or unrelieved pain    Goal: Control of acute pain  Description: Control of acute pain  Outcome: Ongoing  Goal: Control of chronic pain  Description: Control of chronic pain  Outcome: Ongoing

## 2020-11-20 NOTE — PROGRESS NOTES
Transitions of Care Pharmacy Service   Medication Review    The patient's list of current home medications has been reviewed. Source(s) of information: patient, surescripts    Based on information provided by the above source(s), I have updated the patient's home med list as described below. I changed or updated the following medications on the patient's home medication list:  Discontinued Potassium PO - list cleanup  Fish Oil 1000mg - list cleanup     Added None      Adjusted   Biotin 1000 mcg - added sig 1QD  Omeprazole 10mg changed to 20mg - added sig 1QD      Other Notes Omeprazole 20mg - OTC           Please feel free to call me with any questions about this encounter. Thank you. This note will be reviewed and co-signed by the [facility] Pharmacist. The pharmacist will review inpatient orders and contact the physician about any discrepancies. Babs Boyle, pharmacy technician  [facility] Pharmacy Service  Phone:  [phone]  Fax: [phone]      Electronically signed by Babs Boyle on 11/20/2020 at 1:21 PM     Prior to Admission medications    Medication Sig Start Date End Date Taking?  Authorizing Provider   bisoprolol-hydrochlorothiazide (ZIAC) 5-6.25 MG per tablet TAKE 1 TABLET DAILY (NEED AN APPOINTMENT FOR FURTHER REFILLS)       Biotin 1000 MCG TABS Take 1 tablet by mouth daily        omeprazole 20 MG EC tablet Take 20 mg by mouth daily

## 2020-11-20 NOTE — PROGRESS NOTES
[facility]  Office: [address], DO, Franky Wahlf, DO, Garfield Walker, DO, Jane Acuna Blood, DO, Riaz Chakraborty MD, Cecil Mclaughlin MD, Vicky Sheridan MD, Vamshi Draper MD, Tim Hernandez MD, Alfonso Posadas MD, Liza Rincon MD, Mable Jackson MD, Concha Bryant MD, Bassem Wasserman, DO, Yanci Erickson MD, Tien Hernandez MD, Farheen King, DO, Deyanira Davis MD,  Xena Cordova, DO, Wallace Gill MD, Oziel Devi MD, Neymar Breen, [facility], [facility], CNP, Addie Espinosa, CNP, Kraig Ace, CNS, Abhi King, CNP, Gerilyn Cockayne, CNP, Randy Pimentel, CNP, Marisela Lerner, CNP, Camillo Schaumann, CNP, Jewel Clifton PA-C, Zane Chakraborty, [facility], Kath Loud, CNP, Allan Viramontes, CNP, Douglas Eisenberg, CNP, Trinity Wick, CNP, [facility], [address]    Progress Note    11/20/2020    10:06 AM    Name:   Army Davis  MRN:     [id number]     Acct:      [de-identified]   Room:   2003/2003-02   Day:  0  Admit Date:  11/19/2020 10:44 AM    PCP:   Renetta Campos MD  Code Status:  Full Code    Subjective:     C/C:   Chief Complaint   Patient presents with    Abdominal Pain     UPPER ABD PAIN  X 4 DAYS     Interval History Status: significantly improved. Significant improvement. No abdominal discomfort today. Resolution of nausea. Lab work unremarkable. Vitals stable. HIDA scan scheduled for later today. Surgery continues their evaluation and we appreciate their recommendations. Brief History:     11/19 -admitted through the emergency department with a 2-day history of RUQ pain exacerbated by meals. Pain developed after eating a large pizza. Elevated bilirubin and CT scan shows pericholecystitis. Patient admitted for surgical evaluation. 11/20 - Significant improvement. No abdominal discomfort today. Resolution of nausea. Lab work unremarkable. Vitals stable.   HIDA scan scheduled for later today      Review of Systems:     Constitutional:  negative for chills, fevers, sweats  Respiratory:  negative for cough, dyspnea on exertion, shortness of breath, wheezing  Cardiovascular:  negative for chest pain, chest pressure/discomfort, lower extremity edema, palpitations  Gastrointestinal:  negative for abdominal pain, constipation, diarrhea, nausea, vomiting  Neurological:  negative for dizziness, headache    Medications: Allergies:  No Known Allergies    Current Meds:   Scheduled Meds:    [Held by provider] pantoprazole  40 mg Oral QAM AC    sodium chloride flush  10 mL Intravenous 2 times per day    piperacillin-tazobactam  3.375 g Intravenous Q8H     Continuous Infusions:    dextrose 5% and 0.45% NaCl with KCl 20 mEq 100 mL/hr at 20 4719     PRN Meds: sodium chloride flush, potassium chloride **OR** potassium alternative oral replacement **OR** potassium chloride, magnesium sulfate, acetaminophen **OR** acetaminophen, polyethylene glycol, promethazine **OR** ondansetron, nicotine, morphine **OR** morphine    Data:     Past Medical History:   has a past medical history of Colon cancer ([facility] [address] and Melanoma ([facility] [address] Social History:   reports that he quit smoking about 12 years ago. He has never used smokeless tobacco. He reports current alcohol use of about 20.0 standard drinks of alcohol per week. He reports that he does not use drugs. Family History:   Family History   Problem Relation Age of Onset   [facility] COPD Mother     Cancer Father        Vitals:  /74   Pulse 82   Temp 99 °F (37.2 °C) (Oral)   Resp 15   Ht 5' 9\" (1.753 m)   Wt 280 lb 1.6 oz (127.1 kg)   SpO2 93%   BMI 41.36 kg/m²   Temp (24hrs), Av.6 °F (37 °C), Min:98.2 °F (36.8 °C), Max:99.1 °F (37.3 °C)    No results for input(s): POCGLU in the last 72 hours. I/O (24Hr):     Intake/Output Summary (Last 24 hours) at 2020 1006  Last data filed at 2020 0515  Gross per 24 hour   Intake 1346.67 ml   Output --   Net 1346.67 ml Labs:  Hematology:  Recent Labs     11/19/20  1120 11/20/20 0618   WBC 7.3 7.3   RBC 3.82* 3.52*   HGB 13.3 12.2*   HCT 38.4* 36.1*   .5 102.6   MCH 34.8* 34.7*   MCHC 34.6 33.8   RDW 13.1 13.2    130*   MPV 9.2 9.3   INR  --  1.2     Chemistry:  Recent Labs     11/19/20  1120 11/20/20  0618   * 134*   K 3.8 4.1   CL 97* 99   CO2 29 28   GLUCOSE 128* 144*   BUN 12 10   CREATININE 0.62* 0.55*   ANIONGAP 8* 7*   LABGLOM >60 >60   GFRAA >60 >60   CALCIUM 9.2 8.8     Recent Labs     11/19/20 1120 11/20/20 0618   PROT 7.6 6.9   LABALBU 4.3 3.7   AST 68* 49*   ALT 28 22   ALKPHOS 98 83   BILITOT 1.91* 1.67*   BILIDIR 0.88* 0.76*   AMYLASE 20* 32   LIPASE 17 33     ABG:No results found for: POCPH, PHART, PH, POCPCO2, XJA9RAA, PCO2, POCPO2, PO2ART, PO2, POCHCO3, CQG0PYT, HCO3, NBEA, PBEA, BEART, BE, THGBART, THB, CMZ6ELJ, UMAD6RDA, U6YLJKIF, O2SAT, FIO2  Lab Results   Component Value Date/Time    SPECIAL RIGHT HAND, 12ML, JK 11/19/2020 01:06 PM     Lab Results   Component Value Date/Time    CULTURE NO GROWTH 16 HOURS 11/19/2020 01:06 PM       Radiology:  Us Gallbladder Ruq    Result Date: 11/19/2020  1. No gallstones, negative sonographic Cedeño's sign but pericholecystic fluid is present. Acalculous cholecystitis could give this appearance.  2. Fatty infiltration of the liver       Physical Examination:        General appearance:  alert, cooperative and no distress  Mental Status:  oriented to person, place and time and normal affect  Lungs:  clear to auscultation bilaterally, normal effort  Heart:  regular rate and rhythm, no murmur  Abdomen:  soft, nontender, nondistended, normal bowel sounds, no masses, hepatomegaly, splenomegaly  Extremities:  no edema, redness, tenderness in the calves  Skin:  no gross lesions, rashes, induration    Assessment:        Hospital Problems           Last Modified POA    * (Principal) Cholecystitis 11/19/2020 Yes    Essential hypertension 11/19/2020 Yes History of colon cancer 11/19/2020 Yes    Obesity (BMI 35.0-39.9 without comorbidity) (Chronic) 11/19/2020 Yes    GERD (gastroesophageal reflux disease) 11/19/2020 Yes          Plan:        1. Maintain n.p.o. status  2. Continue IV antibiotics  3. HIDA scan scheduled for later this morning  4. Surgery consultation underway and we appreciate the recommendations  5. Continue home medications as outlined in the orders  6. Definitive treatment decisions will be based upon HIDA scan findings.     LAWRENCE Roldan NP  11/20/2020  10:06 AM

## 2020-11-20 NOTE — PROGRESS NOTES
Patient weight is between 101-149kg. For prophylaxis with Enoxaparin, Pharmacy adjusted the dose to account for the patient's increased body weight in accordance with hospital approved protocol. The dose has been changed to 30mg BID. Please contact pharmacy with any concerns @ [phone]. Thank you.    April Garcia  11/20/2020 6:03 PM

## 2020-11-21 LAB
ABSOLUTE EOS #: <0.03 K/UL (ref 0–0.44)
ABSOLUTE IMMATURE GRANULOCYTE: 0.03 K/UL (ref 0–0.3)
ABSOLUTE LYMPH #: 0.8 K/UL (ref 1.1–3.7)
ABSOLUTE MONO #: 0.79 K/UL (ref 0.1–1.2)
ALBUMIN SERPL-MCNC: 3.5 G/DL (ref 3.5–5.2)
ALBUMIN/GLOBULIN RATIO: ABNORMAL (ref 1–2.5)
ALP BLD-CCNC: 77 U/L (ref 40–129)
ALT SERPL-CCNC: 20 U/L (ref 5–41)
AMYLASE: 27 U/L (ref 28–100)
ANION GAP SERPL CALCULATED.3IONS-SCNC: 6 MMOL/L (ref 9–17)
AST SERPL-CCNC: 48 U/L
BASOPHILS # BLD: 1 % (ref 0–2)
BASOPHILS ABSOLUTE: 0.04 K/UL (ref 0–0.2)
BILIRUB SERPL-MCNC: 1.63 MG/DL (ref 0.3–1.2)
BUN BLDV-MCNC: 10 MG/DL (ref 8–23)
BUN/CREAT BLD: 15 (ref 9–20)
CALCIUM SERPL-MCNC: 8.5 MG/DL (ref 8.6–10.4)
CHLORIDE BLD-SCNC: 102 MMOL/L (ref 98–107)
CHOLESTEROL/HDL RATIO: 8.1
CHOLESTEROL: 154 MG/DL
CO2: 25 MMOL/L (ref 20–31)
CREAT SERPL-MCNC: 0.66 MG/DL (ref 0.7–1.2)
DIFFERENTIAL TYPE: ABNORMAL
EOSINOPHILS RELATIVE PERCENT: 0 % (ref 1–4)
GFR AFRICAN AMERICAN: >60 ML/MIN
GFR NON-AFRICAN AMERICAN: >60 ML/MIN
GFR SERPL CREATININE-BSD FRML MDRD: ABNORMAL ML/MIN/{1.73_M2}
GFR SERPL CREATININE-BSD FRML MDRD: ABNORMAL ML/MIN/{1.73_M2}
GLUCOSE BLD-MCNC: 152 MG/DL (ref 70–99)
HCT VFR BLD CALC: 35.4 % (ref 40.7–50.3)
HDLC SERPL-MCNC: 19 MG/DL
HEMOGLOBIN: 11.6 G/DL (ref 13–17)
IMMATURE GRANULOCYTES: 0 %
LDL CHOLESTEROL: 96 MG/DL (ref 0–130)
LYMPHOCYTES # BLD: 10 % (ref 24–43)
MAGNESIUM: 1.8 MG/DL (ref 1.6–2.6)
MCH RBC QN AUTO: 34.4 PG (ref 25.2–33.5)
MCHC RBC AUTO-ENTMCNC: 32.8 G/DL (ref 28.4–34.8)
MCV RBC AUTO: 105 FL (ref 82.6–102.9)
MONOCYTES # BLD: 10 % (ref 3–12)
NRBC AUTOMATED: 0 PER 100 WBC
PDW BLD-RTO: 13 % (ref 11.8–14.4)
PLATELET # BLD: 151 K/UL (ref 138–453)
PLATELET ESTIMATE: ABNORMAL
PMV BLD AUTO: 9.3 FL (ref 8.1–13.5)
POTASSIUM SERPL-SCNC: 4.1 MMOL/L (ref 3.7–5.3)
RBC # BLD: 3.37 M/UL (ref 4.21–5.77)
RBC # BLD: ABNORMAL 10*6/UL
SEG NEUTROPHILS: 79 % (ref 36–65)
SEGMENTED NEUTROPHILS ABSOLUTE COUNT: 6.55 K/UL (ref 1.5–8.1)
SODIUM BLD-SCNC: 133 MMOL/L (ref 135–144)
TOTAL PROTEIN: 7 G/DL (ref 6.4–8.3)
TRIGL SERPL-MCNC: 193 MG/DL
VLDLC SERPL CALC-MCNC: ABNORMAL MG/DL (ref 1–30)
WBC # BLD: 8.2 K/UL (ref 3.5–11.3)
WBC # BLD: ABNORMAL 10*3/UL

## 2020-11-21 PROCEDURE — 6360000002 HC RX W HCPCS: Performed by: SURGERY

## 2020-11-21 PROCEDURE — 85025 COMPLETE CBC W/AUTO DIFF WBC: CPT

## 2020-11-21 PROCEDURE — 83036 HEMOGLOBIN GLYCOSYLATED A1C: CPT

## 2020-11-21 PROCEDURE — 6370000000 HC RX 637 (ALT 250 FOR IP): Performed by: SURGERY

## 2020-11-21 PROCEDURE — 99232 SBSQ HOSP IP/OBS MODERATE 35: CPT | Performed by: FAMILY MEDICINE

## 2020-11-21 PROCEDURE — 82150 ASSAY OF AMYLASE: CPT

## 2020-11-21 PROCEDURE — 6370000000 HC RX 637 (ALT 250 FOR IP): Performed by: FAMILY MEDICINE

## 2020-11-21 PROCEDURE — 2580000003 HC RX 258: Performed by: SURGERY

## 2020-11-21 PROCEDURE — 1200000000 HC SEMI PRIVATE

## 2020-11-21 PROCEDURE — 2500000003 HC RX 250 WO HCPCS: Performed by: FAMILY MEDICINE

## 2020-11-21 PROCEDURE — 80061 LIPID PANEL: CPT

## 2020-11-21 PROCEDURE — 36415 COLL VENOUS BLD VENIPUNCTURE: CPT

## 2020-11-21 PROCEDURE — 2500000003 HC RX 250 WO HCPCS: Performed by: SURGERY

## 2020-11-21 PROCEDURE — 80053 COMPREHEN METABOLIC PANEL: CPT

## 2020-11-21 PROCEDURE — 83735 ASSAY OF MAGNESIUM: CPT

## 2020-11-21 PROCEDURE — G0378 HOSPITAL OBSERVATION PER HR: HCPCS

## 2020-11-21 RX ADMIN — METOPROLOL TARTRATE 25 MG: 25 TABLET, FILM COATED ORAL at 20:26

## 2020-11-21 RX ADMIN — PIPERACILLIN AND TAZOBACTAM 3.38 G: 3; .375 INJECTION, POWDER, LYOPHILIZED, FOR SOLUTION INTRAVENOUS at 08:47

## 2020-11-21 RX ADMIN — HYDROMORPHONE HYDROCHLORIDE 0.5 MG: 1 INJECTION, SOLUTION INTRAMUSCULAR; INTRAVENOUS; SUBCUTANEOUS at 12:27

## 2020-11-21 RX ADMIN — HYDROMORPHONE HYDROCHLORIDE 0.5 MG: 1 INJECTION, SOLUTION INTRAMUSCULAR; INTRAVENOUS; SUBCUTANEOUS at 00:15

## 2020-11-21 RX ADMIN — HYDROMORPHONE HYDROCHLORIDE 1 MG: 1 INJECTION, SOLUTION INTRAMUSCULAR; INTRAVENOUS; SUBCUTANEOUS at 03:51

## 2020-11-21 RX ADMIN — DEXTROSE MONOHYDRATE, SODIUM CHLORIDE, AND POTASSIUM CHLORIDE: 50; 4.5; 1.49 INJECTION, SOLUTION INTRAVENOUS at 13:56

## 2020-11-21 RX ADMIN — PIPERACILLIN AND TAZOBACTAM 3.38 G: 3; .375 INJECTION, POWDER, LYOPHILIZED, FOR SOLUTION INTRAVENOUS at 01:31

## 2020-11-21 RX ADMIN — OXYCODONE HYDROCHLORIDE 5 MG: 5 TABLET ORAL at 08:56

## 2020-11-21 RX ADMIN — DEXTROSE MONOHYDRATE, SODIUM CHLORIDE, AND POTASSIUM CHLORIDE: 50; 4.5; 1.49 INJECTION, SOLUTION INTRAVENOUS at 00:20

## 2020-11-21 RX ADMIN — FAMOTIDINE 20 MG: 10 INJECTION, SOLUTION INTRAVENOUS at 20:25

## 2020-11-21 RX ADMIN — FAMOTIDINE 20 MG: 10 INJECTION, SOLUTION INTRAVENOUS at 08:47

## 2020-11-21 RX ADMIN — ENOXAPARIN SODIUM 30 MG: 30 INJECTION SUBCUTANEOUS at 20:26

## 2020-11-21 RX ADMIN — ENOXAPARIN SODIUM 30 MG: 30 INJECTION SUBCUTANEOUS at 08:47

## 2020-11-21 RX ADMIN — PIPERACILLIN AND TAZOBACTAM 3.38 G: 3; .375 INJECTION, POWDER, LYOPHILIZED, FOR SOLUTION INTRAVENOUS at 17:14

## 2020-11-21 ASSESSMENT — ENCOUNTER SYMPTOMS
DIARRHEA: 0
NAUSEA: 0
SHORTNESS OF BREATH: 0
CHOKING: 0
VOICE CHANGE: 0
RHINORRHEA: 0
VOMITING: 0
WHEEZING: 0
CONSTIPATION: 0
SINUS PRESSURE: 0
COUGH: 0
BACK PAIN: 0
ABDOMINAL PAIN: 1
CHEST TIGHTNESS: 0

## 2020-11-21 ASSESSMENT — PAIN DESCRIPTION - LOCATION
LOCATION: ABDOMEN

## 2020-11-21 ASSESSMENT — PAIN DESCRIPTION - DESCRIPTORS
DESCRIPTORS: ACHING;BURNING
DESCRIPTORS: ACHING;BURNING
DESCRIPTORS: ACHING;DISCOMFORT
DESCRIPTORS: ACHING;BURNING
DESCRIPTORS: ACHING;BURNING
DESCRIPTORS: ACHING;DISCOMFORT
DESCRIPTORS: ACHING;BURNING

## 2020-11-21 ASSESSMENT — PAIN DESCRIPTION - FREQUENCY
FREQUENCY: CONTINUOUS

## 2020-11-21 ASSESSMENT — PAIN DESCRIPTION - PAIN TYPE
TYPE: SURGICAL PAIN

## 2020-11-21 ASSESSMENT — PAIN - FUNCTIONAL ASSESSMENT
PAIN_FUNCTIONAL_ASSESSMENT: ACTIVITIES ARE NOT PREVENTED

## 2020-11-21 ASSESSMENT — PAIN SCALES - GENERAL
PAINLEVEL_OUTOF10: 0
PAINLEVEL_OUTOF10: 2
PAINLEVEL_OUTOF10: 3
PAINLEVEL_OUTOF10: 0
PAINLEVEL_OUTOF10: 1
PAINLEVEL_OUTOF10: 5
PAINLEVEL_OUTOF10: 3
PAINLEVEL_OUTOF10: 3
PAINLEVEL_OUTOF10: 1
PAINLEVEL_OUTOF10: 4
PAINLEVEL_OUTOF10: 6
PAINLEVEL_OUTOF10: 1
PAINLEVEL_OUTOF10: 8
PAINLEVEL_OUTOF10: 2

## 2020-11-21 ASSESSMENT — PAIN DESCRIPTION - ONSET
ONSET: ON-GOING

## 2020-11-21 ASSESSMENT — PAIN DESCRIPTION - ORIENTATION
ORIENTATION: RIGHT;LOWER

## 2020-11-21 ASSESSMENT — PAIN DESCRIPTION - PROGRESSION
CLINICAL_PROGRESSION: NOT CHANGED

## 2020-11-21 NOTE — PLAN OF CARE
Problem: Falls - Risk of:  Goal: Will remain free from falls  Description: Will remain free from falls  11/20/2020 2251 by Marlon Venegas RN  Outcome: Ongoing  11/20/2020 0950 by Marbella Drummond RN  Outcome: Ongoing  Note: Gait steady, calls for assist as needed  Goal: Absence of physical injury  Description: Absence of physical injury  Outcome: Ongoing     Problem: Pain:  Goal: Pain level will decrease  Description: Pain level will decrease  Outcome: Ongoing  Goal: Control of acute pain  Description: Control of acute pain  11/20/2020 2251 by Marlon Venegas RN  Outcome: Ongoing  11/20/2020 0950 by Marbella Drummond RN  Outcome: Ongoing  Note: Denies pain this am

## 2020-11-21 NOTE — PROGRESS NOTES
pain.  Patient reported that he was on hunting trip in [place] and ate a large meal Florecita. Pain has been intermittent, had nausea, denied vomiting. Patient has history of hypertension and takes bisoprolol hydrochlorothiazide. He denies history of diabetes, hyperlipidemia. Evaluation emergency room showed normal white count, elevated bilirubin 1.91, ultrasound gallbladder showed pericholecystic fluid with negative Cedeño. HIDA scan showed non visualization of gall bladder suggestive of acute cholecystitis. Patient was taken to Lap cholecystectomy and was found to have gangrenous cholecystitis and needed open cholecystectomy. PMH:  Past Medical History:   Diagnosis Date    [facility] ([facility] [address]     Melanoma ([facility] [address]       Allergies: No Known Allergies   Medications :  sodium chloride flush, 10 mL, Intravenous, 2 times per day  famotidine (PEPCID) injection, 20 mg, Intravenous, BID  enoxaparin, 30 mg, Subcutaneous, BID  piperacillin-tazobactam, 3.375 g, Intravenous, Q8H        Review of Systems   Review of Systems   Constitutional: Negative for activity change, appetite change, fatigue, fever and unexpected weight change. HENT: Negative for congestion, nosebleeds, rhinorrhea, sinus pressure, sneezing and voice change. Respiratory: Negative for cough, choking, chest tightness, shortness of breath and wheezing. Cardiovascular: Negative for chest pain, palpitations and leg swelling. Gastrointestinal: Positive for abdominal pain. Negative for constipation, diarrhea, nausea and vomiting. Genitourinary: Negative for difficulty urinating, discharge, dysuria, frequency and testicular pain. Musculoskeletal: Negative for back pain. Skin: Negative for rash. Neurological: Negative for dizziness, weakness, light-headedness and headaches. Hematological: Does not bruise/bleed easily.    Psychiatric/Behavioral: Negative for agitation, behavioral problems, confusion, self-injury, sleep disturbance and suicidal ideas.     Objective :      Current Vitals : Temp: 99 °F (37.2 °C),  Pulse: 99, Resp: 20, BP: (!) 142/77, SpO2: 93 %  Last 24 Hrs Vitals   Patient Vitals for the past 24 hrs:   BP Temp Temp src Pulse Resp SpO2   11/21/20 0346 (!) 142/77 99 °F (37.2 °C) Oral 99 20 93 %   11/20/20 2319 (!) 142/79 98.6 °F (37 °C) Oral 96 18 94 %   11/20/20 1910 (!) 147/75 97.7 °F (36.5 °C) Oral 87 18 97 %   11/20/20 1800 (!) 145/79 99.3 °F (37.4 °C) Oral 85 15 96 %   11/20/20 1745 (!) 159/89 97 °F (36.1 °C) Temporal 87 20 95 %   11/20/20 1730 (!) 155/91 -- -- 91 24 94 %   11/20/20 1715 (!) 145/81 -- -- 96 19 91 %   11/20/20 1707 (!) 159/80 97.5 °F (36.4 °C) Temporal 97 24 94 %   11/20/20 0819 126/74 99 °F (37.2 °C) Oral 82 15 93 %     Intake / output   11/20 0701 - 11/21 0700  In: 4577 [I.V.:4477]  Out: 765 [Urine:625; Drains:65]  Physical Exam:  Physical Exam  Vitals signs and nursing note reviewed. Constitutional:       General: He is not in acute distress. Appearance: He is not diaphoretic. HENT:      Head: Normocephalic and atraumatic. Nose:      Right Sinus: No maxillary sinus tenderness or frontal sinus tenderness. Left Sinus: No maxillary sinus tenderness or frontal sinus tenderness. Mouth/Throat:      Pharynx: No oropharyngeal exudate. Eyes:      General: No scleral icterus. Conjunctiva/sclera: Conjunctivae normal.      Pupils: Pupils are equal, round, and reactive to light. Neck:      Musculoskeletal: Full passive range of motion without pain and neck supple. Thyroid: No thyromegaly. Vascular: No JVD. Cardiovascular:      Rate and Rhythm: Normal rate and regular rhythm. Pulses:           Dorsalis pedis pulses are 2+ on the right side and 2+ on the left side. Heart sounds: Normal heart sounds. No murmur. Pulmonary:      Effort: Pulmonary effort is normal.      Breath sounds: Normal breath sounds. No wheezing or rales. Abdominal:      Palpations: Abdomen is soft.  There is no mass. Tenderness: There is no abdominal tenderness. Lymphadenopathy:      Head:      Right side of head: No submandibular adenopathy. Left side of head: No submandibular adenopathy. Cervical: No cervical adenopathy. Skin:     General: Skin is warm. Neurological:      Mental Status: He is alert and oriented to person, place, and time. Motor: No tremor. Psychiatric:         Behavior: Behavior is cooperative. Laboratory findings:    Recent Labs     11/19/20  1120 11/20/20 0618 11/21/20  0527   WBC 7.3 7.3 8.2   HGB 13.3 12.2* 11.6*   HCT 38.4* 36.1* 35.4*    130* 151   INR  --  1.2  --      Recent Labs     11/19/20  1120 11/20/20 0618 11/21/20  0527   * 134* 133*   K 3.8 4.1 4.1   CL 97* 99 102   CO2 29 28 25   GLUCOSE 128* 144* 152*   BUN 12 10 10   CREATININE 0.62* 0.55* 0.66*   MG  --   --  1.8   CALCIUM 9.2 8.8 8.5*     Recent Labs     11/19/20  1120 11/20/20 0618 11/21/20  0527   PROT 7.6 6.9 7.0   LABALBU 4.3 3.7 3.5   AST 68* 49* 48*   ALT 28 22 20   ALKPHOS 98 83 77   BILITOT 1.91* 1.67* 1.63*   BILIDIR 0.88* 0.76*  --    AMYLASE 20* 32 27*   LIPASE 17 33  --           No results found for: SPECGRAV, PROTEINU, RBCUA, BLOODU, BACTERIA, NITRU, WBCUA, LEUKOCYTESUR    Imaging / Clinical Data :-   Nm Hepatobiliary    Result Date: 11/20/2020  Nonvisualization of the gallbladder including post morphine imaging. Findings are consistent with acute cholecystitis. Us Gallbladder Ruq    Result Date: 11/19/2020  1. No gallstones, negative sonographic Cedeño's sign but pericholecystic fluid is present. Acalculous cholecystitis could give this appearance. 2. Fatty infiltration of the liver        Clinical Course : stable  Assessment and Plan  :        1. Right upper quadrant pain- secondary to  gangrenous acalculous cholecystitis. S/p lap luke converted to open cholecystectomy. advance diet , decrease IV fluids, pain control. Monitor LFT.    2. Morbid obesity - life style changes. Screen for DM and     Continue to monitor vitals , Intake / output ,  Cell count , HGB , Kidney function, oxygenation  as indicated . Plan and updates discussed with patient ,  answers  explained to satisfaction.    Plan discussed with Staff  RN     (Please note that portions of this note were completed with a voice recognition program. Efforts were made to edit the dictations but occasionally words are mis-transcribed.)      Joshua Milan MD  11/21/2020

## 2020-11-22 VITALS
SYSTOLIC BLOOD PRESSURE: 140 MMHG | RESPIRATION RATE: 18 BRPM | OXYGEN SATURATION: 96 % | BODY MASS INDEX: 41.77 KG/M2 | DIASTOLIC BLOOD PRESSURE: 76 MMHG | HEIGHT: 69 IN | TEMPERATURE: 98.6 F | HEART RATE: 79 BPM | WEIGHT: 282 LBS

## 2020-11-22 PROBLEM — K81.0 ACUTE GANGRENOUS CHOLECYSTITIS: Status: ACTIVE | Noted: 2020-11-22

## 2020-11-22 LAB
ABSOLUTE EOS #: 0.15 K/UL (ref 0–0.44)
ABSOLUTE IMMATURE GRANULOCYTE: 0.03 K/UL (ref 0–0.3)
ABSOLUTE LYMPH #: 0.75 K/UL (ref 1.1–3.7)
ABSOLUTE MONO #: 0.57 K/UL (ref 0.1–1.2)
ALBUMIN SERPL-MCNC: 3.4 G/DL (ref 3.5–5.2)
ALBUMIN/GLOBULIN RATIO: ABNORMAL (ref 1–2.5)
ALP BLD-CCNC: 80 U/L (ref 40–129)
ALT SERPL-CCNC: 16 U/L (ref 5–41)
ANION GAP SERPL CALCULATED.3IONS-SCNC: 9 MMOL/L (ref 9–17)
AST SERPL-CCNC: 33 U/L
BASOPHILS # BLD: 1 % (ref 0–2)
BASOPHILS ABSOLUTE: 0.04 K/UL (ref 0–0.2)
BILIRUB SERPL-MCNC: 1.26 MG/DL (ref 0.3–1.2)
BILIRUBIN DIRECT: 0.64 MG/DL
BILIRUBIN, INDIRECT: 0.62 MG/DL (ref 0–1)
BUN BLDV-MCNC: 10 MG/DL (ref 8–23)
CALCIUM SERPL-MCNC: 8.7 MG/DL (ref 8.6–10.4)
CHLORIDE BLD-SCNC: 100 MMOL/L (ref 98–107)
CO2: 25 MMOL/L (ref 20–31)
CREAT SERPL-MCNC: 0.5 MG/DL (ref 0.7–1.2)
DIFFERENTIAL TYPE: ABNORMAL
EOSINOPHILS RELATIVE PERCENT: 2 % (ref 1–4)
ESTIMATED AVERAGE GLUCOSE: 91 MG/DL
GFR AFRICAN AMERICAN: >60 ML/MIN
GFR NON-AFRICAN AMERICAN: >60 ML/MIN
GFR SERPL CREATININE-BSD FRML MDRD: ABNORMAL ML/MIN/{1.73_M2}
GFR SERPL CREATININE-BSD FRML MDRD: ABNORMAL ML/MIN/{1.73_M2}
GLUCOSE BLD-MCNC: 121 MG/DL (ref 70–99)
HBA1C MFR BLD: 4.8 % (ref 4–6)
HCT VFR BLD CALC: 34.3 % (ref 40.7–50.3)
HEMOGLOBIN: 11.3 G/DL (ref 13–17)
IMMATURE GRANULOCYTES: 1 %
LYMPHOCYTES # BLD: 12 % (ref 24–43)
MAGNESIUM: 2 MG/DL (ref 1.6–2.6)
MCH RBC QN AUTO: 34.1 PG (ref 25.2–33.5)
MCHC RBC AUTO-ENTMCNC: 32.9 G/DL (ref 28.4–34.8)
MCV RBC AUTO: 103.6 FL (ref 82.6–102.9)
MONOCYTES # BLD: 9 % (ref 3–12)
NRBC AUTOMATED: 0 PER 100 WBC
PDW BLD-RTO: 12.6 % (ref 11.8–14.4)
PLATELET # BLD: 145 K/UL (ref 138–453)
PLATELET ESTIMATE: ABNORMAL
PMV BLD AUTO: 9.4 FL (ref 8.1–13.5)
POTASSIUM SERPL-SCNC: 3.8 MMOL/L (ref 3.7–5.3)
RBC # BLD: 3.31 M/UL (ref 4.21–5.77)
RBC # BLD: ABNORMAL 10*6/UL
SEG NEUTROPHILS: 75 % (ref 36–65)
SEGMENTED NEUTROPHILS ABSOLUTE COUNT: 4.59 K/UL (ref 1.5–8.1)
SODIUM BLD-SCNC: 134 MMOL/L (ref 135–144)
TOTAL PROTEIN: 6.8 G/DL (ref 6.4–8.3)
TSH SERPL DL<=0.05 MIU/L-ACNC: 2.37 MIU/L (ref 0.3–5)
WBC # BLD: 6.1 K/UL (ref 3.5–11.3)
WBC # BLD: ABNORMAL 10*3/UL

## 2020-11-22 PROCEDURE — 6370000000 HC RX 637 (ALT 250 FOR IP): Performed by: FAMILY MEDICINE

## 2020-11-22 PROCEDURE — 82248 BILIRUBIN DIRECT: CPT

## 2020-11-22 PROCEDURE — 36415 COLL VENOUS BLD VENIPUNCTURE: CPT

## 2020-11-22 PROCEDURE — 97116 GAIT TRAINING THERAPY: CPT

## 2020-11-22 PROCEDURE — 85025 COMPLETE CBC W/AUTO DIFF WBC: CPT

## 2020-11-22 PROCEDURE — G0378 HOSPITAL OBSERVATION PER HR: HCPCS

## 2020-11-22 PROCEDURE — 99232 SBSQ HOSP IP/OBS MODERATE 35: CPT | Performed by: FAMILY MEDICINE

## 2020-11-22 PROCEDURE — 97110 THERAPEUTIC EXERCISES: CPT

## 2020-11-22 PROCEDURE — 83735 ASSAY OF MAGNESIUM: CPT

## 2020-11-22 PROCEDURE — 84443 ASSAY THYROID STIM HORMONE: CPT

## 2020-11-22 PROCEDURE — 80053 COMPREHEN METABOLIC PANEL: CPT

## 2020-11-22 PROCEDURE — 2500000003 HC RX 250 WO HCPCS: Performed by: FAMILY MEDICINE

## 2020-11-22 PROCEDURE — 2580000003 HC RX 258: Performed by: SURGERY

## 2020-11-22 PROCEDURE — 6360000002 HC RX W HCPCS: Performed by: SURGERY

## 2020-11-22 RX ADMIN — METOPROLOL TARTRATE 25 MG: 25 TABLET, FILM COATED ORAL at 09:38

## 2020-11-22 RX ADMIN — ENOXAPARIN SODIUM 30 MG: 30 INJECTION SUBCUTANEOUS at 09:38

## 2020-11-22 RX ADMIN — PIPERACILLIN AND TAZOBACTAM 3.38 G: 3; .375 INJECTION, POWDER, LYOPHILIZED, FOR SOLUTION INTRAVENOUS at 01:38

## 2020-11-22 RX ADMIN — DEXTROSE MONOHYDRATE, SODIUM CHLORIDE, AND POTASSIUM CHLORIDE: 50; 4.5; 1.49 INJECTION, SOLUTION INTRAVENOUS at 01:38

## 2020-11-22 ASSESSMENT — ENCOUNTER SYMPTOMS
ABDOMINAL PAIN: 0
SHORTNESS OF BREATH: 0
CHOKING: 0
NAUSEA: 0
CHEST TIGHTNESS: 0
COUGH: 0
VOMITING: 0
VOICE CHANGE: 0
CONSTIPATION: 0
RHINORRHEA: 0
BACK PAIN: 0
WHEEZING: 0
SINUS PRESSURE: 0
DIARRHEA: 0

## 2020-11-22 ASSESSMENT — PAIN SCALES - GENERAL
PAINLEVEL_OUTOF10: 0

## 2020-11-22 NOTE — PROGRESS NOTES
[facility]  Office: [address], DO, Gordo Vences, DO, Makayla Spiveyint, DO, Florecita Light Blood, DO, Kenneth Rebollar MD, Sandra Steiner MD, José Haley MD, Umm Britton MD, Joyce Beltran MD, Miriam Ulrich MD, Tomasa Villegas MD, Michelle Fernandez MD, Concha Kennedy MD, Lamonte Cooks, DO, Kristofer Urrutia MD, Paulo Corrales MD, Marie Diaz DO, Tressa Henriquez MD,  Janis Sen DO, Gabino Loja MD, Yves Ingram MD, Bryant [facility], [facility], CNP, Keyla Cardozo, CNP, Yoandy Ceja, CNS, Maria Del Rosario Manuel, CNP, Jessica Pike, CNP, Judi Singh, CNP, Eder Huff, CNP, Evelyn Hodges, CNP, Dedra Dudley PA-C, Whit Chanel, [facility], Raji Price, CNP, Faye Quinn, CNP, Laura Oquendo, CNP, Heide Mata, CNP, [facility]      Daily Progress Note     Admit Date: 11/19/2020  Bed/Room No.  2003/2003-02  Admitting Physician : José Haley MD  Code Status :Full Code  Hospital Day:  LOS: 0 days   Chief Complaint:     Chief Complaint   Patient presents with    Abdominal Pain     UPPER ABD PAIN  X 4 DAYS     Principal Problem:    Cholecystitis  Active Problems:    Essential hypertension    History of colon cancer    Obesity (BMI 35.0-39.9 without comorbidity)    GERD (gastroesophageal reflux disease)    Acute gangrenous cholecystitis  Resolved Problems:    * No resolved hospital problems. *    Subjective : Interval History/Significant events :  11/22/20    Patient is eating and drinking OK. He denies any nausea , vomiting. He denies any abd pain. Patient is not requiring any pain medications. Vitals - Stable afebrile  Labs - Hyponatremia 134. Normal white count. Nursing notes , Consults notes reviewed. Overnight events and updates discussed with Nursing staff . Background History:         Samm Donnelly is 63 y.o. male  Who was admitted to the hospital on 11/19/2020 for treatment of Cholecystitis.    Patient presented with 2-day history of right upper quadrant abdominal pain. Patient reported that he was on hunting trip in [place] and ate a large meal Florecita. Pain has been intermittent, had nausea, denied vomiting. Patient has history of hypertension and takes bisoprolol hydrochlorothiazide. He denies history of diabetes, hyperlipidemia. Evaluation emergency room showed normal white count, elevated bilirubin 1.91, ultrasound gallbladder showed pericholecystic fluid with negative Cedeño. HIDA scan showed non visualization of gall bladder suggestive of acute cholecystitis. Patient was taken to Lap cholecystectomy and was found to have gangrenous cholecystitis and needed open cholecystectomy. He had uneventful post op course. PMH:  Past Medical History:   Diagnosis Date    Colon cancer ([facility] [address]     Melanoma ([facility] [address]       Allergies: No Known Allergies   Medications :  metoprolol tartrate, 25 mg, Oral, BID  sodium chloride flush, 10 mL, Intravenous, 2 times per day  enoxaparin, 30 mg, Subcutaneous, BID  piperacillin-tazobactam, 3.375 g, Intravenous, Q8H        Review of Systems   Review of Systems   Constitutional: Negative for activity change, appetite change, fatigue, fever and unexpected weight change. HENT: Negative for congestion, nosebleeds, rhinorrhea, sinus pressure, sneezing and voice change. Respiratory: Negative for cough, choking, chest tightness, shortness of breath and wheezing. Cardiovascular: Negative for chest pain, palpitations and leg swelling. Gastrointestinal: Negative for abdominal pain, constipation, diarrhea, nausea and vomiting. Genitourinary: Negative for difficulty urinating, discharge, dysuria, frequency and testicular pain. Musculoskeletal: Negative for back pain. Skin: Negative for rash. Neurological: Negative for dizziness, weakness, light-headedness and headaches. Hematological: Does not bruise/bleed easily.    Psychiatric/Behavioral: Negative for agitation, behavioral problems, confusion, self-injury, sleep disturbance and suicidal ideas. Objective :      Current Vitals : Temp: 98.6 °F (37 °C),  Pulse: 79, Resp: 18, BP: (!) 140/76, SpO2: 96 %  Last 24 Hrs Vitals   Patient Vitals for the past 24 hrs:   BP Temp Temp src Pulse Resp SpO2 Weight   11/22/20 0730 (!) 140/76 98.6 °F (37 °C) Oral 79 18 96 % --   11/22/20 0400 -- -- -- -- -- -- 282 lb (127.9 kg)   11/22/20 0337 (!) 144/77 98.4 °F (36.9 °C) Oral 77 16 97 % --   11/21/20 2356 138/77 99.3 °F (37.4 °C) Oral 80 19 95 % --   11/21/20 1939 (!) 161/80 98.8 °F (37.1 °C) Oral 75 17 94 % --   11/21/20 1537 (!) 160/79 99.9 °F (37.7 °C) Oral 95 17 96 % --   11/21/20 1100 (!) 144/74 99.1 °F (37.3 °C) Oral 99 18 94 % --     Intake / output   11/21 0701 - 11/22 0700  In: 2194 [P.O.:450; I.V.:1467]  Out: 895 [Urine:750; Drains:27]  Physical Exam:  Physical Exam  Vitals signs and nursing note reviewed. Constitutional:       General: He is not in acute distress. Appearance: He is not diaphoretic. HENT:      Head: Normocephalic and atraumatic. Nose:      Right Sinus: No maxillary sinus tenderness or frontal sinus tenderness. Left Sinus: No maxillary sinus tenderness or frontal sinus tenderness. Mouth/Throat:      Pharynx: No oropharyngeal exudate. Eyes:      General: No scleral icterus. Conjunctiva/sclera: Conjunctivae normal.      Pupils: Pupils are equal, round, and reactive to light. Neck:      Musculoskeletal: Full passive range of motion without pain and neck supple. Thyroid: No thyromegaly. Vascular: No JVD. Cardiovascular:      Rate and Rhythm: Normal rate and regular rhythm. Pulses:           Dorsalis pedis pulses are 2+ on the right side and 2+ on the left side. Heart sounds: Normal heart sounds. No murmur. Pulmonary:      Effort: Pulmonary effort is normal.      Breath sounds: Normal breath sounds. No wheezing or rales. Abdominal:      Palpations: Abdomen is soft. There is no mass. Tenderness: There is no abdominal tenderness. Lymphadenopathy:      Head:      Right side of head: No submandibular adenopathy. Left side of head: No submandibular adenopathy. Cervical: No cervical adenopathy. Skin:     General: Skin is warm. Neurological:      Mental Status: He is alert and oriented to person, place, and time. Motor: No tremor. Psychiatric:         Behavior: Behavior is cooperative. Laboratory findings:    Recent Labs     11/20/20  0618 11/21/20 0527 11/22/20  0544   WBC 7.3 8.2 6.1   HGB 12.2* 11.6* 11.3*   HCT 36.1* 35.4* 34.3*   * 151 145   INR 1.2  --   --      Recent Labs     11/20/20 0618 11/21/20 0527 11/22/20  0544   * 133* 134*   K 4.1 4.1 3.8   CL 99 102 100   CO2 28 25 25   GLUCOSE 144* 152* 121*   BUN 10 10 10   CREATININE 0.55* 0.66* 0.50*   MG  --  1.8 2.0   CALCIUM 8.8 8.5* 8.7     Recent Labs     11/19/20  1120 11/20/20 0618 11/21/20  0527 11/22/20  0544   PROT 7.6 6.9 7.0 6.8   LABALBU 4.3 3.7 3.5 3.4*   TSH  --   --   --  2.37   AST 68* 49* 48* 33   ALT 28 22 20 16   ALKPHOS 98 83 77 80   BILITOT 1.91* 1.67* 1.63* 1.26*   BILIDIR 0.88* 0.76*  --  0.64*   AMYLASE 20* 32 27*  --    LIPASE 17 33  --   --    CHOL  --   --  154  --    HDL  --   --  19*  --    LDLCHOLESTEROL  --   --  96  --    CHOLHDLRATIO  --   --  8.1*  --    TRIG  --   --  193*  --    VLDL  --   --  NOT REPORTED*  --           No results found for: SPECGRAV, PROTEINU, RBCUA, BLOODU, BACTERIA, NITRU, WBCUA, LEUKOCYTESUR    Imaging / Clinical Data :-   Nm Hepatobiliary    Result Date: 11/20/2020  Nonvisualization of the gallbladder including post morphine imaging. Findings are consistent with acute cholecystitis. Us Gallbladder Ruq    Result Date: 11/19/2020  1. No gallstones, negative sonographic Cedeño's sign but pericholecystic fluid is present. Acalculous cholecystitis could give this appearance.  2. Fatty infiltration of the liver        Clinical Course : stable  Assessment and Plan  :        1. Right upper quadrant pain- secondary to  gangrenous acalculous cholecystitis. S/p lap luke converted to open cholecystectomy. tolerating normal diet . Discharge home , PARUL drain to stay. Drain daily. DSD. OK to shower. Follow up with Dr Cristian Moss as outpatient,. 2. Morbid obesity - life style changes. 3. Dyslipidemia - life style changes        Plan and updates discussed with patient ,  answers  explained to satisfaction.    Plan discussed with Staff Mingo Clark RN     (Please note that portions of this note were completed with a voice recognition program. Efforts were made to edit the dictations but occasionally words are mis-transcribed.)      Kathy Gonsalez MD  11/22/2020

## 2020-11-22 NOTE — PROGRESS NOTES
Physical Therapy  Facility/Department: [facility] MED SURG  Daily Treatment Note  NAME: Rene Shah  : 1955  MRN: [id number]    Date of Service: 2020    Discharge Recommendations:  Home independently        Assessment   Body structures, Functions, Activity limitations: Decreased functional mobility   Assessment: Patient independent with gait and transfers. Recommend D/C home with family. Prognosis: Good  Decision Making: Low Complexity  PT Education: Goals;PT Role;Plan of Care;Home Exercise Program;General Safety  REQUIRES PT FOLLOW UP: Yes  Activity Tolerance  Activity Tolerance: Patient Tolerated treatment well     Patient Diagnosis(es): The encounter diagnosis was Cholecystitis. has a past medical history of Colon cancer ([facility] [address] and Melanoma ([facility] [address] has a past surgical history that includes Knee arthroscopy; AV fistula repair; hernia repair; Colonoscopy (2016); Colon surgery; and Cholecystectomy, open (2020). Restrictions     Subjective   General  Chart Reviewed: Yes  Family / Caregiver Present: Yes(pts wife)  Subjective  Subjective: Patient stated he didn't understand the purpose of therapy at this time. He says he has been up and walking on his own throughout the day. General Comment  Comments: Patient didn't appear thrilled with therapy at this date. Educated patient and patient's wife on purpose and goals of therapy. Pain Screening  Patient Currently in Pain: Denies  Vital Signs  Patient Currently in Pain: Denies       Orientation     Cognition      Objective   Bed mobility  Bridging: Independent  Rolling to Left: Independent  Rolling to Right: Independent  Supine to Sit: Independent  Sit to Supine: Independent  Transfers  Sit to Stand: Independent  Stand to sit:  Independent  Bed to Chair: Supervision  Stand Pivot Transfers: Supervision  Squat Pivot Transfers: Supervision  Ambulation  Ambulation?: Yes  Ambulation 1  Surface: level tile  Device: No Device  Assistance: Supervision(Assist with IV pole)  Distance: 35 ft  Comments: Patient wanted to remain in room with ambulation at this time secondary to being up and walking earlier, according to patient. Exercises  Comments: Provided HEP handout and answered patient and family questions. G-Code     OutComes Score                                                     AM-PAC Score             Goals  Short term goals  Time Frame for Short term goals: 3 visits  Short term goal 1: Independent bed mobility  Short term goal 2: Independent sit<>stand  Short term goal 3: Ambulate 150' without device Independent    Plan    Plan  Times per week: 1-2x/day for 5-6x/week  Current Treatment Recommendations: Transfer Training, Gait Training, Endurance Training, Patient/Caregiver Education & Training  Safety Devices  Type of devices:  All fall risk precautions in place, Call light within reach, Left in chair     Therapy Time   Individual Concurrent Group Co-treatment   Time In 0923         Time Out 0953         Minutes 30                 Stacie Tijerina, PTA

## 2020-11-22 NOTE — PLAN OF CARE
Problem: Falls - Risk of:  Goal: Will remain free from falls  Description: Will remain free from falls  11/22/2020 0001 by Tootie Pollard RN  Outcome: Ongoing  11/21/2020 1458 by Francoise Johns  Outcome: Ongoing  Note: Patient independent of all ADLs at home   Ambulatory w/steady gait  Due to recent abdominal surgery made a SBA when out of bed  Goal: Absence of physical injury  Description: Absence of physical injury  Outcome: Ongoing     Problem: Pain:  Goal: Pain level will decrease  Description: Pain level will decrease  11/22/2020 0001 by Tootie Pollard RN  Outcome: Ongoing  11/21/2020 1458 by Francoise Johns  Outcome: Ongoing  Note: PRN pain medication available upon request  Pain assessed using 0-10 scale  Instructed to use non-pharmaceutical measure, splinting and ambulation  Goal: Control of acute pain  Description: Control of acute pain  Outcome: Ongoing

## 2020-11-22 NOTE — PROGRESS NOTES
General Surgery Progress Note    Subjective:     Patient without complaints. No nausea or vomiting. Voiding well. Scheduled Meds:   metoprolol tartrate  25 mg Oral BID    sodium chloride flush  10 mL Intravenous 2 times per day    famotidine (PEPCID) injection  20 mg Intravenous BID    enoxaparin  30 mg Subcutaneous BID    piperacillin-tazobactam  3.375 g Intravenous Q8H     Continuous Infusions:   dextrose 5% and 0.45% NaCl with KCl 20 mEq 50 mL/hr at 11/22/20 0138     PRN Meds:sodium chloride flush, oxyCODONE **OR** oxyCODONE, HYDROmorphone **OR** HYDROmorphone, ondansetron    Objective:   BP (!) 140/76   Pulse 79   Temp 98.6 °F (37 °C) (Oral)   Resp 18   Ht 5' 9\" (1.753 m)   Wt 282 lb (127.9 kg)   SpO2 96%   BMI 41.64 kg/m²     I/O last 3 completed shifts: In: 2194 [P.O.:450; I.V.:1467; IV Piggyback:277]  Out: 496 [Urine:750; Drains:27]    Chest: Breath sounds were clear and equal with no rales, wheezes, or rhonchi. Respiratory effort was normal with no retractions or use of accessory muscles. Cardiovascular: Heart sounds were normal with a regular rate and rhythm. There were no murmurs or gallops. Abdomen: Bowel sounds were normal.  The abdomen was soft and non distended. There was no tenderness, guarding, rebound, or rigidity. There was no masses, hepatosplenomegaly, or hernias. Right upper quadrant wound is healing well. Jules-Narayan with moderate serosanguineous output.     LABS:  Lab Results   Component Value Date    WBC 6.1 11/22/2020    RBC 3.31 11/22/2020    HGB 11.3 11/22/2020    HCT 34.3 11/22/2020    .6 11/22/2020    MCH 34.1 11/22/2020    MCHC 32.9 11/22/2020    RDW 12.6 11/22/2020     11/22/2020    MPV 9.4 11/22/2020       Lab Results   Component Value Date     11/22/2020    K 3.8 11/22/2020     11/22/2020    CO2 25 11/22/2020    BUN 10 11/22/2020    CREATININE 0.50 11/22/2020    GLUCOSE 121 11/22/2020    CALCIUM 8.7 11/22/2020 Assessment/Plan:   1. Status post open cholecystectomy. 2. Wound is healing well. Tolerating diet.   3. Okay for discharge home      Electronically signed by Dee Dee Pineda DO  on 11/22/2020 at 8:24 AM   Tolerating diet

## 2020-11-22 NOTE — DISCHARGE SUMMARY
[facility]  Office: [phone]  Shana [facility] [place] DO, Olivia Beltrán MD, Domitila Yepez MD, Ottoniel Chappell MD, Canelo Beltre MD, Zev Miranda MD, Marissa Hawk MD, Endy Shea MD, Lucretia Hess MD, Concha Antonio MD, Kelli Ewing DO, Avi Prescott MD, Rip Sharp MD, Lg Russell DO, Socorro Martinez MD,  Johny Persaud DO, Juan J Frias MD, Heide Delgado MD, Paul [facility], [facility] CNP, Verfranco Deist CNP, Sophronia Don CNS, Judd Aguila CNP, Jacquetta Ratel CNP, Lafrances Manual CNP, Lorna Yudi CNP, Shelly Pinfranciss CNP, Ai Calvin PA-C, Leticia Victor CNP, Olman Noss CNP, Anshul CNP, Theador Sharper CNP, David Ball CNP, Lelo Dials, [address]      Discharge Summary     Patient ID: Graeme Beard  :  1955   MRN: [id number]     ACCOUNT:  [de-identified]   Patient Location :   Patient's PCP: Marie Perez MD  Admit Date: 2020   Discharge Date: 2020     Length of Stay: 1  Code Status:  Prior  Admitting Physician: Ottoniel Chappell MD  Discharge Physician: Ottoniel Chappell MD     Active Discharge Diagnosis :     Primary Problem  Cholecystitis      [facility] Problems    Diagnosis Date Noted    Acute gangrenous cholecystitis [K81.0] 2020    Cholecystitis [K81.9] 2020    History of colon cancer [Z85.038] 2020    Obesity (BMI 35.0-39.9 without comorbidity) [E66.9] 2020    GERD (gastroesophageal reflux disease) [K21.9] 2020    Essential hypertension [I10] 2017       Admission Condition:  fair     Discharged Condition: stable    Hospital Stay:     Hospital Course:  Graeme Beard is a 63 y.o. male who was admitted for the management of   Cholecystitis , presented to ER with Abdominal Pain (UPPER ABD PAIN  X 4 DAYS)  Patient presented with 2-day history of right upper quadrant abdominal pain.  Patient reported that he was on hunting trip in [place] and ate a large meal Naye Farah has been intermittent, had nausea, denied vomiting.  Patient has history of hypertension and takes bisoprolol hydrochlorothiazide.  He denies history of diabetes, hyperlipidemia.  Evaluation emergency room showed normal white count, elevated bilirubin 1.91, ultrasound gallbladder showed pericholecystic fluid with negative Cedeño. HIDA scan showed non visualization of gall bladder suggestive of acute cholecystitis. Patient was taken to Lap cholecystectomy and was found to have gangrenous cholecystitis and needed open cholecystectomy. He had uneventful post op course. Significant therapeutic interventions:   1. Right upper quadrant pain- secondary to  gangrenous acalculous cholecystitis. S/p lap luke converted to open cholecystectomy.  tolerating normal diet . Discharged home , PARUL drain to stay. Drain daily. DSD. OK to shower. Follow up with Dr Gia Mclain as outpatient,. 2. Morbid obesity - life style changes.    3. Dyslipidemia - life style changes    Significant Diagnostic Studies:   Labs / Micro:/Radiology  Recent Labs     11/22/20  0544 11/21/20  0527 11/20/20  0618   WBC 6.1 8.2 7.3   HGB 11.3* 11.6* 12.2*   HCT 34.3* 35.4* 36.1*   .6* 105.0* 102.6    151 130*     Labs Renal Latest Ref Rng & Units 11/22/2020 11/21/2020 11/20/2020 11/19/2020 10/22/2014   BUN 8 - 23 mg/dL 10 10 10 12 20   Cr 0.70 - 1.20 mg/dL 0.50(L) 0.66(L) 0.55(L) 0.62(L) 0.79   K 3.7 - 5.3 mmol/L 3.8 4.1 4.1 3.8 4.2   Na 135 - 144 mmol/L 134(L) 133(L) 134(L) 134(L) 136     Lab Results   Component Value Date    ALT 16 11/22/2020    AST 33 11/22/2020    ALKPHOS 80 11/22/2020    BILITOT 1.26 (H) 11/22/2020     Lab Results   Component Value Date    TSH 2.37 11/22/2020     No results found for: HAV, HEPAIGM, HEPBIGM, HEPBCAB, HBEAG, HEPCAB  No results found for: VOL, APPEARANCE, COLORU, LABSPEC, LABPH, LEUKBLD, NITRU, GLUCOSEU, KETUA, [address], Nora BRYANT Olivarez  Lab Results   Component Value Date    LABA1C 4.8 11/21/2020     Lab Results   Component Value Date    EAG 91 11/21/2020     Lab Results   Component Value Date    INR 1.2 11/20/2020    PROTIME 15.2 (H) 11/20/2020       Nm Hepatobiliary    Result Date: 11/21/2020  Nonvisualization of the gallbladder including post morphine imaging. Findings are consistent with acute cholecystitis. Us Gallbladder Ruq    Result Date: 11/19/2020  1. No gallstones, negative sonographic Cedeño's sign but pericholecystic fluid is present. Acalculous cholecystitis could give this appearance. 2. Fatty infiltration of the liver           Consultations:    Consults:     Final Specialist Recommendations/Findings:   IP CONSULT TO INTERNAL MEDICINE  IP CONSULT TO GENERAL SURGERY  IP CONSULT TO GENERAL SURGERY      The patient was seen and examined on day of discharge and this discharge summary is in conjunction with any daily progress note from day of discharge. Discharge plan:     Disposition: Home    Physician Follow Up:     Lindsey Correa MD  [address] [place] [id number] [phone]    Schedule an appointment as soon as possible for a visit in 1 week  For wound re-check       Requiring Further Evaluation/Follow Up POST HOSPITALIZATION/Incidental Findings: DSD dressing . Drain PARUL daily. OK to shower. Follow up with gen surgery in 1 week. Diet: regular diet    Activity: As tolerated. Instructions to Patient: lifestyle changes.      Discharge Medications:      Medication List      CONTINUE taking these medications    Biotin 1000 MCG Tabs     bisoprolol-hydroCHLOROthiazide 5-6.25 MG per tablet  Commonly known as:  ZIAC  TAKE 1 TABLET DAILY (NEED AN APPOINTMENT FOR FURTHER REFILLS)     omeprazole 20 MG EC tablet            Time Spent on discharge is  33 mins in patient examination, evaluation, counseling as well as medication reconciliation, prescriptions for required medications, discharge plan and follow up. Electronically signed by   Daryl Edmonds MD  11/22/2020        Thank you Dr. Ajith Wren MD for the opportunity to be involved in this patient's care.

## 2020-11-23 ENCOUNTER — TELEPHONE (OUTPATIENT)
Dept: FAMILY MEDICINE CLINIC | Age: 65
End: 2020-11-23

## 2020-11-23 NOTE — TELEPHONE ENCOUNTER
[facility] Transitions Initial Follow Up Call    Outreach made within 2 business days of discharge: Yes    Patient: Niranjan Guillen Patient : 1955   MRN: [id number]  Reason for Admission: There are no discharge diagnoses documented for the most recent discharge. Discharge Date: 20       Spoke with: Left message to call office     [facility] department[facility]facility: [facility]       Scheduled appointment with PCP within 7-14 days    Follow Up  No future appointments.     [place], [place]

## 2020-11-24 LAB — SURGICAL PATHOLOGY REPORT: NORMAL

## 2020-11-25 ENCOUNTER — TELEPHONE (OUTPATIENT)
Dept: FAMILY MEDICINE CLINIC | Age: 65
End: 2020-11-25

## 2020-11-25 LAB
CULTURE: NORMAL
CULTURE: NORMAL
Lab: NORMAL
Lab: NORMAL
SPECIMEN DESCRIPTION: NORMAL
SPECIMEN DESCRIPTION: NORMAL

## 2020-11-25 NOTE — TELEPHONE ENCOUNTER
[facility] Trans[facility] Initial Follow Up Call    Outreach made within 2 business days of discharge: Yes    Patient: Niranjan Guillen Patient : 1955   MRN: [id number]  Reason for Admission: There are no discharge diagnoses documented for the most recent discharge. Discharge Date: 20       Spoke with: left message     Discharge department[facility]facility: [facility]       Scheduled appointment with PCP within 7-14 days    Follow Up  No future appointments.     Sue Johnson, [address]

## 2021-04-01 ENCOUNTER — TELEPHONE (OUTPATIENT)
Dept: FAMILY MEDICINE CLINIC | Age: 66
End: 2021-04-01

## 2021-04-01 DIAGNOSIS — I10 ESSENTIAL HYPERTENSION: ICD-10-CM

## 2021-04-01 RX ORDER — BISOPROLOL FUMARATE AND HYDROCHLOROTHIAZIDE 5; 6.25 MG/1; MG/1
TABLET ORAL
Qty: 90 TABLET | Refills: 3 | Status: CANCELLED | OUTPATIENT
Start: 2021-04-01

## 2021-04-01 NOTE — TELEPHONE ENCOUNTER
I cannot fill a medication for a patient who has not been seen in 2.5 years. I would recommend he moves up his NTP appointment sooner with whomever is first available. Patient called and reports that pharmacy never received Rx.  Writer contacted and informed that they do have Rx and it is in the process of being filled

## 2021-04-01 NOTE — TELEPHONE ENCOUNTER
Patient is out of BP medication and is requesting medication to be filled until his NP appt with Cecil Rojas. He was a patient of Dr Anjelica Alexander however he hasn't been seen in the office since 12/18/17. He stated that this office has been refilling his medication up until now.     Last OV 12/13/17  Next OV 5/6/21

## 2021-05-04 ENCOUNTER — TELEPHONE (OUTPATIENT)
Dept: FAMILY MEDICINE CLINIC | Age: 66
End: 2021-05-04

## 2021-06-04 ENCOUNTER — OFFICE VISIT (OUTPATIENT)
Dept: FAMILY MEDICINE CLINIC | Age: 66
End: 2021-06-04
Payer: COMMERCIAL

## 2021-06-04 VITALS
TEMPERATURE: 97.5 F | DIASTOLIC BLOOD PRESSURE: 82 MMHG | WEIGHT: 279 LBS | HEART RATE: 95 BPM | OXYGEN SATURATION: 97 % | SYSTOLIC BLOOD PRESSURE: 138 MMHG | HEIGHT: 70 IN | BODY MASS INDEX: 39.94 KG/M2

## 2021-06-04 DIAGNOSIS — Z71.85 IMMUNIZATION COUNSELING: ICD-10-CM

## 2021-06-04 DIAGNOSIS — Z85.038 HISTORY OF COLON CANCER: ICD-10-CM

## 2021-06-04 DIAGNOSIS — Z87.891 PERSONAL HISTORY OF TOBACCO USE: ICD-10-CM

## 2021-06-04 DIAGNOSIS — Z76.89 ENCOUNTER TO ESTABLISH CARE: Primary | ICD-10-CM

## 2021-06-04 DIAGNOSIS — I10 ESSENTIAL HYPERTENSION: ICD-10-CM

## 2021-06-04 DIAGNOSIS — Z00.00 ROUTINE PHYSICAL EXAMINATION: ICD-10-CM

## 2021-06-04 PROBLEM — K81.9 CHOLECYSTITIS: Status: RESOLVED | Noted: 2020-11-19 | Resolved: 2021-06-04

## 2021-06-04 PROBLEM — K81.0 ACUTE GANGRENOUS CHOLECYSTITIS: Status: RESOLVED | Noted: 2020-11-22 | Resolved: 2021-06-04

## 2021-06-04 PROBLEM — E66.9 OBESITY (BMI 35.0-39.9 WITHOUT COMORBIDITY): Chronic | Status: RESOLVED | Noted: 2020-11-19 | Resolved: 2021-06-04

## 2021-06-04 PROCEDURE — 99386 PREV VISIT NEW AGE 40-64: CPT | Performed by: NURSE PRACTITIONER

## 2021-06-04 PROCEDURE — G0296 VISIT TO DETERM LDCT ELIG: HCPCS | Performed by: NURSE PRACTITIONER

## 2021-06-04 SDOH — ECONOMIC STABILITY: FOOD INSECURITY: WITHIN THE PAST 12 MONTHS, THE FOOD YOU BOUGHT JUST DIDN'T LAST AND YOU DIDN'T HAVE MONEY TO GET MORE.: NEVER TRUE

## 2021-06-04 SDOH — ECONOMIC STABILITY: FOOD INSECURITY: WITHIN THE PAST 12 MONTHS, YOU WORRIED THAT YOUR FOOD WOULD RUN OUT BEFORE YOU GOT MONEY TO BUY MORE.: NEVER TRUE

## 2021-06-04 SDOH — ECONOMIC STABILITY: TRANSPORTATION INSECURITY
IN THE PAST 12 MONTHS, HAS THE LACK OF TRANSPORTATION KEPT YOU FROM MEDICAL APPOINTMENTS OR FROM GETTING MEDICATIONS?: NO

## 2021-06-04 SDOH — ECONOMIC STABILITY: TRANSPORTATION INSECURITY
IN THE PAST 12 MONTHS, HAS LACK OF TRANSPORTATION KEPT YOU FROM MEETINGS, WORK, OR FROM GETTING THINGS NEEDED FOR DAILY LIVING?: NO

## 2021-06-04 ASSESSMENT — ENCOUNTER SYMPTOMS
ALLERGIC/IMMUNOLOGIC NEGATIVE: 1
COLOR CHANGE: 0
COUGH: 1
DIARRHEA: 0
SHORTNESS OF BREATH: 0
VOMITING: 0
GASTROINTESTINAL NEGATIVE: 1
WHEEZING: 0
NAUSEA: 0
ANAL BLEEDING: 0
BLOOD IN STOOL: 0
EYES NEGATIVE: 1
CHEST TIGHTNESS: 0

## 2021-06-04 ASSESSMENT — PATIENT HEALTH QUESTIONNAIRE - PHQ9
SUM OF ALL RESPONSES TO PHQ QUESTIONS 1-9: 0
1. LITTLE INTEREST OR PLEASURE IN DOING THINGS: 0
SUM OF ALL RESPONSES TO PHQ QUESTIONS 1-9: 0
2. FEELING DOWN, DEPRESSED OR HOPELESS: 0
SUM OF ALL RESPONSES TO PHQ9 QUESTIONS 1 & 2: 0
SUM OF ALL RESPONSES TO PHQ QUESTIONS 1-9: 0

## 2021-06-04 ASSESSMENT — SOCIAL DETERMINANTS OF HEALTH (SDOH): HOW HARD IS IT FOR YOU TO PAY FOR THE VERY BASICS LIKE FOOD, HOUSING, MEDICAL CARE, AND HEATING?: NOT HARD AT ALL

## 2021-06-04 NOTE — PATIENT INSTRUCTIONS
diet  · Eat 4 to 5 servings of fruit each day. A serving is 1 medium-sized piece of fruit, ½ cup chopped or canned fruit, 1/4 cup dried fruit, or 4 ounces (½ cup) of fruit juice. Choose fruit more often than fruit juice. · Eat 4 to 5 servings of vegetables each day. A serving is 1 cup of lettuce or raw leafy vegetables, ½ cup of chopped or cooked vegetables, or 4 ounces (½ cup) of vegetable juice. Choose vegetables more often than vegetable juice. · Get 2 to 3 servings of low-fat and fat-free dairy each day. A serving is 8 ounces of milk, 1 cup of yogurt, or 1 ½ ounces of cheese. · Eat 6 to 8 servings of grains each day. A serving is 1 slice of bread, 1 ounce of dry cereal, or ½ cup of cooked rice, pasta, or cooked cereal. Try to choose whole-grain products as much as possible. · Limit lean meat, poultry, and fish to 2 servings each day. A serving is 3 ounces, about the size of a deck of cards. · Eat 4 to 5 servings of nuts, seeds, and legumes (cooked dried beans, lentils, and split peas) each week. A serving is 1/3 cup of nuts, 2 tablespoons of seeds, or ½ cup of cooked beans or peas. · Limit fats and oils to 2 to 3 servings each day. A serving is 1 teaspoon of vegetable oil or 2 tablespoons of salad dressing. · Limit sweets and added sugars to 5 servings or less a week. A serving is 1 tablespoon jelly or jam, ½ cup sorbet, or 1 cup of lemonade. · Eat less than 2,300 milligrams (mg) of sodium a day. If you limit your sodium to 1,500 mg a day, you can lower your blood pressure even more. · Be aware that all of these are the suggested number of servings for people who eat 1,800 to 2,000 calories a day. Your recommended number of servings may be different if you need more or fewer calories. Tips for success  · Start small. Do not try to make dramatic changes to your diet all at once. You might feel that you are missing out on your favorite foods and then be more likely to not follow the plan.  Make small changes, and stick with them. Once those changes become habit, add a few more changes. · Try some of the following:  ? Make it a goal to eat a fruit or vegetable at every meal and at snacks. This will make it easy to get the recommended amount of fruits and vegetables each day. ? Try yogurt topped with fruit and nuts for a snack or healthy dessert. ? Add lettuce, tomato, cucumber, and onion to sandwiches. ? Combine a ready-made pizza crust with low-fat mozzarella cheese and lots of vegetable toppings. Try using tomatoes, squash, spinach, broccoli, carrots, cauliflower, and onions. ? Have a variety of cut-up vegetables with a low-fat dip as an appetizer instead of chips and dip. ? Sprinkle sunflower seeds or chopped almonds over salads. Or try adding chopped walnuts or almonds to cooked vegetables. ? Try some vegetarian meals using beans and peas. Add garbanzo or kidney beans to salads. Make burritos and tacos with mashed kmi beans or black beans. Where can you learn more? Go to https://[employer].appMobi. org and sign in to your [employer] account. Enter X838 in the [facility] box to learn more about \"DASH Diet: Care Instructions. \"     If you do not have an account, please click on the \"Sign Up Now\" link. Current as of: August 31, 2020               Content Version: 12.8  © 2006-2021 [employer]. Care instructions adapted under license by [facility] ([facility]). If you have questions about a medical condition or this instruction, always ask your healthcare professional. [place] [address] any warranty or liability for your use of this information. Patient Education        High Blood Pressure: Care Instructions  Overview     It's normal for blood pressure to go up and down throughout the day. But if it stays up, you have high blood pressure. Another name for high blood pressure is hypertension.   Despite what a lot of people think, high blood pressure usually doesn't cause headaches or make you feel dizzy or lightheaded. It usually has no symptoms. But it does increase your risk of stroke, heart attack, and other problems. You and your doctor will talk about your risks of these problems based on your blood pressure. Your doctor will give you a goal for your blood pressure. Your goal will be based on your health and your age. Lifestyle changes, such as eating healthy and being active, are always important to help lower blood pressure. You might also take medicine to reach your blood pressure goal.  Follow-up care is a key part of your treatment and safety. Be sure to make and go to all appointments, and call your doctor if you are having problems. It's also a good idea to know your test results and keep a list of the medicines you take. How can you care for yourself at home? Medical treatment  · If you stop taking your medicine, your blood pressure will go back up. You may take one or more types of medicine to lower your blood pressure. Be safe with medicines. Take your medicine exactly as prescribed. Call your doctor if you think you are having a problem with your medicine. · Talk to your doctor before you start taking aspirin every day. Aspirin can help certain people lower their risk of a heart attack or stroke. But taking aspirin isn't right for everyone, because it can cause serious bleeding. · See your doctor regularly. You may need to see the doctor more often at first or until your blood pressure comes down. · If you are taking blood pressure medicine, talk to your doctor before you take decongestants or anti-inflammatory medicine, such as ibuprofen. Some of these medicines can raise blood pressure. · Learn how to check your blood pressure at home. Lifestyle changes  · Stay at a healthy weight. This is especially important if you put on weight around the waist. Losing even 10 pounds can help you lower your blood pressure.   · If your doctor the complications from the disease. Your doctor or pharmacist can give you Shingrix as a shot in your upper arm. Shingrix provides strong protection against shingles and PHN. Two doses of Shingrix is more than 90% effective at preventing shingles and PHN. Protection stays above 85% for at least the first four years after you get vaccinated. Shingrix is the preferred vaccine, over Zostavax®, a shingles vaccine in use since 2006. Who Should Get Shingrix? Healthy adults 50 years and older should get two doses of Shingrix,  by 2 to 6 months. You should get Shingrix even if in the past you  had shingles   received Zostavax   are not sure if you had chickenpox  Vaccine for Those 50 Years and Older  Shingrix reduces the risk of shingles and PHN by more than 90% in people 48 and older. CDC recommends the vaccine for healthy adults 48 and older. There is no maximum age for getting Shingrix. If you had shingles in the past, you can get Shingrix to help prevent future occurrences of the disease. There is no specific length of time that you need to wait after having shingles before you can receive Shingrix, but generally you should make sure the shingles rash has gone away before getting vaccinated. You can get Shingrix whether or not you remember having had chickenpox in the past. Studies show that more than 99% of Americans 40 years and older have had chickenpox, even if they dont remember having the disease. Chickenpox and shingles are related because they are caused by the same virus (varicella zoster virus). After a person recovers from chickenpox, the virus stays dormant (inactive) in the body. It can reactivate years later and cause shingles. If you had Zostavax in the recent past, you should wait at least eight weeks before getting Shingrix. Talk to your healthcare provider to determine the best time to get Shingrix. Shingrix is available in [place] and pharmacies.  To find doctors offices or pharmacies near you that offer the vaccine, visit [facility] Vaccine Finder. If you have questions about Shingrix, talk with your healthcare provider. Who Should Not Get Shingrix? The side effects of the Shingrix are temporary, and usually last 2 to 3 days. While you may experience pain for a few days after getting Shingrix, the pain will be less severe than having shingles and the complications from the disease. You should not get Shingrix if you:  have ever had a severe allergic reaction to any component of the vaccine or after a dose of Shingrix   tested negative for immunity to varicella zoster virus. If you test negative, you should get chickenpox vaccine. currently have shingles   currently are pregnant or breastfeeding. Women who are pregnant or breastfeeding should wait to get Shingrix. If you have a minor acute (starts suddenly) illness, such as a cold, you may get Shingrix. But if you have a moderate or severe acute illness, you should usually wait until you recover before getting the vaccine. This includes anyone with a temperature of 101.3°F or higher. How Well Does Shingrix Work? Two doses of Shingrix provides strong protection against shingles and postherpetic neuralgia (PHN), the most common complication of shingles. In adults 48to 71years old who got two doses, Shingrix was 97% effective in preventing shingles; among adults 70 years and older, Shingrix was 91% effective. In adults 48to 71years old who got two doses, Shingrix was 91% effective in preventing PHN; among adults 70 years and older, Shingrix was 89% effective. Shingrix protection remained high (more than 85%) in people 70 years and older throughout the four years following vaccination. Since your risk of shingles and PHN increases as you get older, it is important to have strong protection against shingles in your older years. Top of Page   What are the possible side effects of Shingrix?   Studies show that Shingrix is safe. The vaccine helps your body create a strong defense against shingles. As a result, you are likely to have temporary side effects from getting the shots. The side effects may affect your ability to do normal daily activities for 2 to 3 days. Most people got a sore arm with mild or moderate pain after getting Shingrix, and some also had redness and swelling where they got the shot. Some people felt tired, had muscle pain, a headache, shivering, fever, stomach pain, or nausea. About 1 out of 6 people who got Shingrix experienced side effects that prevented them from doing regular activities. Symptoms went away on their own in about 2 to 3 days. Side effects were more common in younger people. You might have a reaction to the first or second dose of Shingrix, or both doses. If you experience side effects, you may choose to take over-the-counter pain medicine such as ibuprofen or acetaminophen. Severe allergic reactions to any vaccine are very rare. Signs of a severe allergic reaction can include hives, swelling of the face and throat, difficulty breathing, a fast heartbeat, dizziness, and weakness. These would start a few minutes to a few hours after the vaccination. If you have a severe allergic reaction or other emergency that cant wait, call 9-1-1 or go to the nearest hospital. Otherwise, call your doctor. If you experience side effects from Shingrix, you should report them to the Vaccine Adverse Event Reporting System (VAERS). Your doctor might file this report, or you can do it yourself through the VAERS website, or by calling [phone]. If you have any questions about side effects from Shingrix, talk with your doctor. The shingles vaccine does not contain thimerosal (a preservative containing mercury). Top of Page   How Can I Pay For Shingrix?   There are several ways shingles vaccine may be paid for:  Medicare  Medicare Part D plans cover the shingles vaccine, but there may be a cost to you depending on your plan. There may be a copay for the vaccine, or you may need to pay in full then get reimbursed for a certain amount. Medicare Part B does not cover the shingles vaccine. Medicaid  Medicaid may or may not cover the vaccine. Contact your insurer to find out. Private health insurance  Many private health insurance plans will cover the vaccine. Contact your insurer to find out. Vaccine assistance programs  Some pharmaceutical companies provide vaccines to eligible adults who cannot afford them. You may want to check with the vaccine , [employer], about Shingrix. If you do not currently have health insurance, learn more about affordable health coverage options. To find doctors offices or pharmacies near you that offer the vaccine, visit [facility]. Taken from the [facility]    COVID-19 Vaccines: [employer] Patient Counseling Pearls  CDC/FDA/ACOG Guidance  Key Points  The Pfizer-BioNTech and Moderna mRNA vaccines are currently authorized for emergency use by the FDA in the [place] for persons ? 11 yo & ?26 yo, respectively. The CDC encourages individuals to enroll in the [employer]s free smartphone tool called vRB-Doorssafe that uses text messaging & web surveys to provide personalized health check-ins after pts receive a COVID-19 vaccination. Are these new mRNA vaccines safe? Yes, w/ rare exceptions. Risk of severe complications from [id number] is far higher than risk of complications from the vaccines1  Many existing vaccines, incl inactivated influenza vaccines, can cause rare complications, incl Guillain-Matlock syndrome (GBS), seizures, & unexplained death. Public health experts view the risks as minuscule when considering the millions of people who are safely vaccinated each year2  FDA carefully reviews all safety data from clinical trials & authorizes emergency vaccine use only when the expected benefits outweigh potential risks. 2 However, it take time, & larger numbers of people getting vaccinated, before very rare or long-term side effects come to light, as w/ any other vaccine or therapeutic  CDCs Advisory Committee on Immunization Practices (ACIP) also reviews all safety data before recommending any COVID-19 vaccine for use. FDA & CDC will continue to monitor the safety of COVID-19 vaccines & make sure even very rare side effects are identified1,2  Both mRNA COVID-19 vaccines were tested in large clinical trials and have good safety profiles2  [facility] encourages individuals to enroll in the [employer] free smartphone tool called v-safe that uses text messaging & web surveys to provide personalized health check-ins after pts receive a COVID-19 vaccination. V-safe also reminds pts to get their 2nd dose1  Footnotes  1 [facility] 2021. mRNA vaccine background  In contrast to most vaccines which use weakened or inactivated versions of the dz-causing pathogen, mRNA vaccines take advantage of the process that cells use to make proteins to trigger an immune response  The vaccines contain strands of messenger RNA inside of a special coating, which helps protect the mRNA from breakdown & helps it enter dendritic cells & macrophages in lymph nodes near the injection site  The mRNA provides instructions for the cell on how to make a piece of the SARS-CoV-2 spike protein. Since only the protein is made, it doesnt harm the person, but it is antigenic  Once the spike protein is made, the mRNA is broken down by cellular enzymes. The mRNA never enters the cells nucleus or affects genetic material. This info should help counter misinformation that mRNA vaccines alter or modify ones genetic make-up  mRNA vaccine technology has been studied for over a decade.  Early-stage trials of mRNA vaccines have been carried out for influenza, Zika, rabies, & CMV, but early challenges (such as instability of free mRNA, unintended inflammatory outcomes, & modest immune responses) thwarted their continued (68.5%)  Headache (63%)  Myalgia (59.6%)  Arthralgia (44.8%)  Chills (43.4%)  Lymphadenopathy (axillary swelling + tenderness in vaccination arm) (21.4% in participants <66 yo, 12.4% in those ?72)  Severe adverse reactions, more frequent after the 2nd dose, occurred in:2  0.0%-4.6% of participants in [facility] study, more frequent in adults <54 yo  0.2%-9.7% of participants in [facility] study, more frequent in adults <66 yo    Contraindications of mRNA vaccines  Hx of severe allergic rxn (eg, anaphylaxis) after a previous dose of an mRNA COVID-19 vaccine or any of its components. Action: Don't vaccinate  Hx of immediate allergic rxn (defined as any hypersensitivity-related s/sx [eg, urticaria, angioedema, resp distress such as wheezing, stridor] w/in 4h of administration of a vaccine or medication) of any severity to a previous dose of an mRNA vaccine or any of its components, incl polyethylene glycol (PEG). Action: Don't vaccinate unless evaluated/cleared by allergist/immunologist & vaccine can be given w/ 30 min of observation in a setting w/ advanced medical care  Hx of immediate allergic rxn of any severity to polysorbate (due to potential cross-reactivity w/ PEG). Action: Don't vaccinate unless evaluated/cleared by allergist/immunologist and vaccine can be given w/ 30 min of observation in a setting w/ advanced medical care  Note: Individuals w/ an immediate allergic rxn to the 1st dose of an mRNA vaccine shouldn't receive additional doses of either mRNA vaccine.  Providers should try to determine whether rxns are immediate allergic rxns or other types of common rxns (vasovagal/post-vaccination side effects)  Note: Previous hx of COVID-19 (symptomatic or asymptomatic) isn't a contraindication to vaccination, although CDC recommends that symptomatic pts defer vaccination until they are recovered & out of isolation  Precautions for mRNA vaccines1  Hx of any immediate allergic rxn to any other vaccine/injectable tx (ie, IM, IV, or SC vaccines or therapies [excluding allergy shots]) but NOT related to mRNA vaccine components or polysorbate, as these are contraindications  Persons w/ rxn to a vaccine/injectable tx that contains multiple components, one of which PEG, another mRNA vaccine component or polysorbate, but in whom it is unknown which component elicited the immediate allergic reaction  Action:  such pts re: unknown risks of experiencing a severe allergic rxn and balance risks against vax benefits. Perform risk assessment, consider deferral of vaccination and/or referral to allergy/immunology; observe for 30min if decision to vaccinate  Neither contraindications nor precautions to vaccination1  Delayed, local injection-site rxns (eg, erythema, induration, pruritus) after the first mRNA vaccine dose are neither a contraindication nor a precaution to 2nd dose; reported in some pts, incl in Maria Ines Ledkathryn clinical trial participants, beginning a few days through wk 2 after the 1st dose; sometimes quite large; not thought to represent anaphylaxis risk for 2nd dose (but prefer opposite arm for 2nd dose)  Allergic rxns (incl severe allergic rxns) not related to vaccines, injectable tx, components of mRNA COVID-19 vaccines (incl PEG), or polysorbates, such as allergic rxns related to food, pet, venom, or environmental allergies, or allergies to oral meds (incl PO equivalents of injectable meds)  Allergy-related counseling points  In Pfizer-BioNTech clinical trials, potential allergic rxns occurred in 0.63% of vaccine recipients vs 0.51% of placebo.  In the [employer] trials, the rates were 1.5% of vaccine recipients vs. 1.1% of placebo2  However, after emergency authorization & distribution of the vaccines in the [address] [place] [address], reports emerged of severe allergic rxns (anaphylaxis); polyethylene glycol is currently suspected as the cause of such rxns  Neither of the mRNA vaccines contains eggs, gelatin, latex, or preservatives1  Vaccine providers should be trained to observe pts after COVID-19 vaccination to monitor for occurrence of immediate adverse rxns. Those w/ a hx of immediate allergic rxn (of any severity) to a vaccine or injectable tx or persons w/ hx of anaphylaxis due to any cause should be observed for 30 min; all other pts should be observed for 15 min1    Despite limited data, vaccination may be offered to pregnant & lactating pts  Pregnant & breastfeeding pts werent enrolled in vaccine trials, therefore there are no safety data yet in these populations1  CDC (ACIP) recommends that women who are pregnant or breastfeeding & part of a group recommended to receive COVID-19 vaccine may choose to be vaccinated  Pregnant pts w/ COVID-19 are at increased risk for severe illness: Resp illness requires hospitalization in 5% to 6% of all SARS-CoV-2-infected pregnant women.  Adjusted risk ratio in pregnant women vs nonpregnant pts was 3.0 for ICU admission, 2.9 for mechanical ventilation, and 1.7 for death);2  birth risk also ranges from 10% to 25%, w/ rates as high as 60% among women w/ critical illness2  Based on how mRNA vaccines work, experts believe they are unlikely to pose a specific risk to pregnant pts; although mRNA vaccines havent been studied specifically in pregnant pts, mRNA platforms used in the vaccines have been in development for the last 10y; similar mRNA vaccines have been used in clinical trials targeting other infxns such as Zika, as well as several types of CA (eg, breast, melanoma)2  mRNA vaccines pose no risk to recipient of acquiring infxn from the vaccine & have potential benefits over live-attenuated virus vaccines, inactivated or subunit vaccines, & DNA-based vaccines2  Remind pts that vaccination during pregnancy is already common to prevent maternal/infant morbidities from other infectious diseases (eg, influenza, Tdap vaccine)  Routine pregnancy testing isn't required prior to receiving a COVID-19 vaccine CDC recommends that clinicians consider the following in decision-making about vaccinating pregnant pts: likelihood of exposure to SARS-CoV-2, risk of COVID-19 to pt & potential risks to fetus; efficacy of vaccine; known side effects of vaccine, & lack of data during pregnancy  ACOG recommendations about COVID-19 vaccination during pregnancy/lactation:2  COVID-19 vaccines shouldn't be withheld from pregnant pts who meet criteria for vaccination1  COVID-19 vaccines should be offered to lactating pts when they meet ACIP-recommended priority criteria1  When discussing vaccination w/ the pt, important considerations incl: level of virus activity in community; potential efficacy of vaccine, risk & potential of maternal dz, incl effects on fetus & ; & safety of the vaccine for the pregnant pt & fetus2  While a conversation w/ a clinician may be helpful, it isn't a requirement2  Pregnancy testing isn't a requirement prior to receiving a COVID-19 vaccine1,2  Pregnant pts who decline the vaccine should be supported in their decision2    Vaccination considerations for individuals w/ underlying medical conditions  Pts w/ underlying medical conditions can safely receive the COVID-19 vaccine as long as there are no contraindications to vaccination such as a severe allergic rxn to any of its components, per CDC  Phase 2/3 clinical trials show similar safety and efficacy profiles in persons w/ underlying medical conditions, incl those that place them at increased risk for severe COVID-19, compared w/ persons w/o comorbidities  Pts w/ immunocompromise (eg, HIV, medications)1  Such pts might be at increased risk for severe COVID-19  These pts may receive a COVID-19 vaccine, but should be made aware of limited safety data  Immunocompromised pts should also be made aware of the potential for reduced immune response to the vaccine, & the need to continue to follow current guidance to protect themselves against COVID-19  Pts w/ Questions. Updated 11/2/20. Accessed 1/7/21    Frequently Asked Questions about COVID-19 Vaccination. Updated 12/13/20. Accessed 12/17/20    What Clinicians Need to Know About the Pfizer-BioNTech COVID-19 Vaccine. Updated 12/13/20. Free full-text PDF    2 FDA 2021. FDA Statement on Following the Authorized Dosing Schedules for COVID-19 Vaccines. FDA Statement. Jan 4, 2021. Accessed 1/7/21    Vaccines and Related Biological Products Advisory Committee Meeting. Published 12/10/20. FDA Briefing Document. Pfizer-BioNTech COVID-19 Vaccine. Free full-text PDF    Vaccines and Related Biological Products Advisory Committee Meeting. Published 12/17/20. FDA Briefing Document. Moderna COVID-19 Vaccine. Free full-text PDF    What is lung cancer screening? Lung cancer screening is a way in which doctors check the lungs for early signs of cancer in people who have no symptoms of lung cancer. A low-dose CT scan uses much less radiation than a normal CT scan and shows a more detailed image of the lungs than a standard X-ray. The goal of lung cancer screening is to find cancer early, before it has a chance to grow, spread, or cause problems. One large study found that smokers who were screened with low-dose CT scans were less likely to die of lung cancer than those who were screened with standard X-ray. Below is a summary of the things you need to know regarding screening for lung cancer with low-dose computed tomography (LDCT). This is a screening program that involves routine annual screening with LDCT studies of the lung. The LDCTs are done using low-dose radiation that is not thought to increase your cancer risk. If you have other serious medical conditions (other cancers, congestive heart failure) that limit your life expectancy to less than 10 years, you should not undergo lung cancer screening with LDCT. The chance is 20%-60% that the LDCT result will show abnormalities.   This would require additional testing which could include repeat imaging or even invasive procedures. Most (about 95%) of \"abnormal\" LDCT results are false in the sense that no lung cancer is ultimately found. Additionally, some (about 10%) of the cancers found would not affect your life expectancy, even if undetected and untreated. If you are still smoking, the single most important thing that you can do to reduce your risk of dying of lung cancer is to quit. For this screening to be covered by Medicare and most other insurers, strict criteria must be met. If you do not meet these criteria, but still wish to undergo LDCT testing, you will be required to sign a waiver indicating your willingness to pay for the scan.

## 2021-06-04 NOTE — PROGRESS NOTES
[address]  Dept: [phone]    Patricia Adhikari is a 63 y.o. male who presents today for his medical conditions/complaintsas noted below. Patricia Adhikari is here today c/o Establish Care    Past Medical History:   Diagnosis Date    Colon cancer ([address]     DX 52, no chemo / rad    Hypertension     Melanoma [facility])       Past Surgical History:   Procedure Laterality Date    AV FISTULA REPAIR      CHOLECYSTECTOMY, LAPAROSCOPIC N/A 2020    CHOLECYSTECTOMY LAPAROSCOPIC , CONVERTED TO OPEN CHOLECYSTECTOMY performed by David Rogers MD at [id number], OPEN  2020    COLON SURGERY      COLONOSCOPY  2016    [address]      x2    JOINT REPLACEMENT Bilateral     SKIN CANCER EXCISION         Family History   Problem Relation Age of Onset    COPD Mother     Cancer Father        Social History     Tobacco Use    Smoking status: Former Smoker     Packs/day: 1.00     Years: 30.00     Pack years: 30.00     Quit date: 2007     Years since quittin.5    Smokeless tobacco: Never Used   Substance Use Topics    Alcohol use: Yes     Alcohol/week: 20.0 standard drinks     Types: 20 Standard drinks or equivalent per week     Comment: Former heavy alcohol use (0.5 gallon daily), now only social drinking      Current Outpatient Medications   Medication Sig Dispense Refill    ZINC PO Take by mouth daily      Cholecalciferol (VITAMIN D3 PO) Take by mouth daily      Ascorbic Acid (VITAMIN C PO) Take by mouth daily      Biotin 1000 MCG TABS Take 1 tablet by mouth daily       omeprazole 20 MG EC tablet Take 20 mg by mouth daily        No current facility-administered medications for this visit.      No Known Allergies      HPI:     HPI    Presents today c/o establish care  Used to follow / [facility]     Specialists - none current     PMH - HTN , melanoma, colon cancer (age 50, no chemo / rad)  PSH - AV Auto Differential; Future  - Comprehensive Metabolic Panel; Future  - Hemoglobin A1C; Future  - Lipid Panel; Future  - TSH with Reflex; Future    Baseline labs   Encouraged regular exercise & healthy diet & weight loss   Referral to colo-rectal for colonoscopy update  Immunizations discussed / encouraged     3. History of colon cancer    - External Referral To Colorectal Surgery    4. Personal history of tobacco use    - [place] VISIT TO DISCUSS LUNG CA SCREEN W LDCT  - CT Lung Screen (Annual); Future    Low Dose CT (LDCT) Lung Screening criteria met   Age 50-69   Pack year smoking >30   Still smoking or less than 15 year since quit   No sign or symptoms of lung cancer   > 11 months since last LDCT     Risks and benefits of lung cancer screening with LDCT scans discussed:    Significance of positive screen - False-positive LDCT results often occur. 95% of all positive results do not lead to a diagnosis of cancer. Usually further imaging can resolve most false-positive results; however, some patients may require invasive procedures. Over diagnosis risk - 10% to 12% of screen-detected lung cancer cases are over diagnosedthat is, the cancer would not have been detected in the patient's lifetime without the screening. Need for follow up screens annually to continue lung cancer screening effectiveness     Risks associated with radiation from annual LDCT- Radiation exposure is about the same as for a mammogram, which is about 1/3 of the annual background radiation exposure from everyday life. Starting screening at age 54 is not likely to increase cancer risk from radiation exposure. Patients with comorbidities resulting in life expectancy of < 10 years, or that would preclude treatment of an abnormality identified on CT, should not be screened due to lack of benefit. To obtain maximal benefit from this screening, smoking cessation and long-term abstinence from smoking is critical    5.  Essential hypertension

## 2021-06-23 ENCOUNTER — TELEPHONE (OUTPATIENT)
Dept: ONCOLOGY | Age: 66
End: 2021-06-23

## 2021-06-23 NOTE — LETTER
6/23/2021        [place] [address]    Dear Yvonne Vega:    Your healthcare provider has ordered a low dose CT scan of the chest for lung cancer screening. You will find enclosed, information about CT lung screening. Please review the statement of understanding, you will be asked to sign a copy of this at the time of your CT scan    If you have not already been contacted to make the appointment for your scan, please call our scheduling department at [phone]    Keep in mind that CT lung screening does not take the place of smoking cessation. If you are a current smoker, you will find enclosed smoking cessation resources. Please do not hesitate to contact me if you have any questions or concerns.     [address] [facility],      [facility] Screening [facility]  [phone]

## 2021-06-29 ENCOUNTER — HOSPITAL ENCOUNTER (OUTPATIENT)
Dept: CT IMAGING | Age: 66
Discharge: HOME | End: 2021-07-01
Payer: COMMERCIAL

## 2021-06-29 DIAGNOSIS — Z87.891 PERSONAL HISTORY OF TOBACCO USE: ICD-10-CM

## 2021-06-29 PROCEDURE — 71271 CT THORAX LUNG CANCER SCR C-: CPT

## 2022-06-03 ENCOUNTER — TELEPHONE (OUTPATIENT)
Dept: ONCOLOGY | Age: 67
End: 2022-06-03

## 2024-05-06 ENCOUNTER — HOSPITAL ENCOUNTER (OUTPATIENT)
Age: 69
Discharge: HOME | End: 2024-05-08
Payer: COMMERCIAL

## 2024-05-06 ENCOUNTER — HOSPITAL ENCOUNTER (OUTPATIENT)
Dept: GENERAL RADIOLOGY | Age: 69
Discharge: HOME | End: 2024-05-08
Payer: COMMERCIAL

## 2024-05-06 DIAGNOSIS — M25.552 LEFT HIP PAIN: ICD-10-CM

## 2024-05-06 PROCEDURE — 73502 X-RAY EXAM HIP UNI 2-3 VIEWS: CPT

## (undated) DEVICE — APPLIER CLP M/L SHFT DIA5MM 15 LIG LIGAMAX 5

## (undated) DEVICE — TOTAL TRAY, DB, 100% SILI FOLEY, 16FR 10: Brand: MEDLINE

## (undated) DEVICE — BLANKET WRM W29.9XL79.1IN UP BODY FORC AIR MISTRAL-AIR

## (undated) DEVICE — DRAIN SURG FLAT W7MMXL20CM FULL PERF

## (undated) DEVICE — GARMENT,MEDLINE,DVT,INT,CALF,MED, GEN2: Brand: MEDLINE

## (undated) DEVICE — BAG SPEC REM 224ML W4XL6IN DIA10MM 1 HND GYN DISP ENDOPCH

## (undated) DEVICE — 5 MM ASPIRATION/INJECTION NEEDLE, 16 GAUGE, 40 CM LENGTH: Brand: CORE DYNAMICS

## (undated) DEVICE — DRAPE,REIN 53X77,STERILE: Brand: MEDLINE

## (undated) DEVICE — GLOVE SURG SZ 8 CRM LTX FREE POLYISOPRENE POLYMER BEAD ANTI

## (undated) DEVICE — NEEDLE SYR 18GA L1.5IN RED PLAS HUB S STL BLNT FILL W/O

## (undated) DEVICE — TROCAR: Brand: KII® SLEEVE

## (undated) DEVICE — ADHESIVE SKIN CLSR 0.7ML TOP DERMBND ADV

## (undated) DEVICE — TOWEL,OR,DSP,ST,BLUE,DLX,XR,4/PK,20PK/CS: Brand: MEDLINE

## (undated) DEVICE — PAD,ABDOMINAL,5"X9",ST,LF,25/BX: Brand: MEDLINE INDUSTRIES, INC.

## (undated) DEVICE — SYRINGE IRRIG 60ML SFT PLIABLE BLB EZ TO GRP 1 HND USE W/

## (undated) DEVICE — SCISSOR SURG CRV ENDOCUT TIP FOR LAP DISP

## (undated) DEVICE — SUTURE PDS II SZ 2-0 L27IN ABSRB VLT SH L26MM 1/2 CIR Z317H

## (undated) DEVICE — SUTURE PERMAHAND SZ 0 L30IN NONABSORBABLE BLK SILK BRAID A306H

## (undated) DEVICE — GLOVE SURG SZ 7 CRM LTX FREE POLYISOPRENE POLYMER BEAD ANTI

## (undated) DEVICE — SKIN PREP TRAY W/CHG: Brand: MEDLINE INDUSTRIES, INC.

## (undated) DEVICE — Device

## (undated) DEVICE — Z DISCONTINUED USE 2624853 GLOVE SURG SZ 75 L12IN THK91MIL BRN LTX FREE

## (undated) DEVICE — NEEDLE HYPO 25GA L1.5IN BLU POLYPR HUB S STL REG BVL STR

## (undated) DEVICE — GAUZE,PACKING STRIP,PLAIN,1/2"X5YD,STRL: Brand: CURAD

## (undated) DEVICE — CONTAINER,SPECIMEN,OR STERILE,4OZ: Brand: MEDLINE

## (undated) DEVICE — KIT,ANTI FOG,W/SPONGE & FLUID,SOFT PACK: Brand: MEDLINE

## (undated) DEVICE — RESERVOIR,SUCTION,100CC,SILICONE: Brand: MEDLINE

## (undated) DEVICE — SUTURE VCRL SZ 2-0 L36IN ABSRB UD L36MM CT-1 1/2 CIR J945H

## (undated) DEVICE — SUTURE PDS II SZ 0 L27IN ABSRB VLT L26MM CT-2 1/2 CIR Z334H

## (undated) DEVICE — 1 ML TUBERCULIN SYRINGE LUER-LOCK TIP: Brand: MONOJECT

## (undated) DEVICE — SUTURE MCRYL SZ 4-0 L18IN ABSRB UD L16MM PC-3 3/8 CIR PRIM Y845G

## (undated) DEVICE — INTENDED FOR TISSUE SEPARATION, AND OTHER PROCEDURES THAT REQUIRE A SHARP SURGICAL BLADE TO PUNCTURE OR CUT.: Brand: BARD-PARKER ® CARBON RIB-BACK BLADES

## (undated) DEVICE — SPONGE LAP W18XL18IN WHT COT 4 PLY FLD STRUNG RADPQ DISP ST

## (undated) DEVICE — SEALER ENDOSCP NANO COAT OPN DIV CRV L JAW LIGASURE IMPACT

## (undated) DEVICE — DRAPE IRRIG FLD WRM W44XL44IN W/ AORN STD PRTBL INTRATEMP

## (undated) DEVICE — YANKAUER,POOLE TIP,STERILE,50/CS: Brand: MEDLINE

## (undated) DEVICE — SYRINGE MED 50ML LUERLOCK TIP

## (undated) DEVICE — TROCAR: Brand: KII SHIELDED BLADED ACCESS SYSTEM

## (undated) DEVICE — TROCARS: Brand: KII® BALLOON BLUNT TIP SYSTEM

## (undated) DEVICE — GAUZE,SPONGE,FLUFF,6"X6.75",STRL,5/TRAY: Brand: MEDLINE

## (undated) DEVICE — GOWN,AURORA,NON-REINFORCED,2XL: Brand: MEDLINE